# Patient Record
Sex: MALE | Race: WHITE | Employment: OTHER | URBAN - METROPOLITAN AREA
[De-identification: names, ages, dates, MRNs, and addresses within clinical notes are randomized per-mention and may not be internally consistent; named-entity substitution may affect disease eponyms.]

---

## 2017-05-18 ENCOUNTER — OFFICE VISIT (OUTPATIENT)
Dept: FAMILY MEDICINE CLINIC | Age: 62
End: 2017-05-18

## 2017-05-18 VITALS
BODY MASS INDEX: 26.55 KG/M2 | HEART RATE: 60 BPM | SYSTOLIC BLOOD PRESSURE: 128 MMHG | WEIGHT: 179.25 LBS | HEIGHT: 69 IN | DIASTOLIC BLOOD PRESSURE: 74 MMHG | RESPIRATION RATE: 14 BRPM

## 2017-05-18 DIAGNOSIS — N40.0 BENIGN NON-NODULAR PROSTATIC HYPERPLASIA WITHOUT LOWER URINARY TRACT SYMPTOMS: ICD-10-CM

## 2017-05-18 DIAGNOSIS — Z00.00 WELL ADULT EXAM: Primary | ICD-10-CM

## 2017-05-18 DIAGNOSIS — R53.83 FATIGUE, UNSPECIFIED TYPE: ICD-10-CM

## 2017-05-18 DIAGNOSIS — Z82.49 FAMILY HISTORY OF CORONARY ARTERIOSCLEROSIS: ICD-10-CM

## 2017-05-18 LAB
HEMOCCULT STL QL: NEGATIVE
HEMOCCULT STL QL: NORMAL
HEMOCCULT STL QL: NORMAL

## 2017-05-18 PROCEDURE — 82270 OCCULT BLOOD FECES: CPT | Performed by: FAMILY MEDICINE

## 2017-05-18 PROCEDURE — 99396 PREV VISIT EST AGE 40-64: CPT | Performed by: FAMILY MEDICINE

## 2017-05-18 RX ORDER — GUAIFENESIN/PHENYLPROPANOLAMIN
1 EXPECTORANT ORAL DAILY
COMMUNITY

## 2017-05-18 ASSESSMENT — ENCOUNTER SYMPTOMS
TROUBLE SWALLOWING: 0
ABDOMINAL PAIN: 0
CONSTIPATION: 0
EYE PAIN: 0
BLOOD IN STOOL: 0
NAUSEA: 0
COUGH: 0
BACK PAIN: 0
SORE THROAT: 0
CHEST TIGHTNESS: 0
SHORTNESS OF BREATH: 0

## 2017-05-18 ASSESSMENT — PATIENT HEALTH QUESTIONNAIRE - PHQ9
1. LITTLE INTEREST OR PLEASURE IN DOING THINGS: 0
SUM OF ALL RESPONSES TO PHQ QUESTIONS 1-9: 0
SUM OF ALL RESPONSES TO PHQ9 QUESTIONS 1 & 2: 0
2. FEELING DOWN, DEPRESSED OR HOPELESS: 0

## 2017-05-19 ENCOUNTER — TELEPHONE (OUTPATIENT)
Dept: FAMILY MEDICINE CLINIC | Age: 62
End: 2017-05-19

## 2017-05-19 LAB
ABSOLUTE BASO #: 0 K/UL (ref 0–0.1)
ABSOLUTE EOS #: 0.1 K/UL (ref 0.1–0.4)
ABSOLUTE LYMPH #: 2 K/UL (ref 0.8–5.2)
ABSOLUTE MONO #: 0.4 K/UL (ref 0.1–0.9)
ABSOLUTE NEUT #: 2.8 K/UL (ref 1.3–9.1)
ALBUMIN SERPL-MCNC: 4.5 G/DL (ref 3.2–5.3)
ALK PHOSPHATASE: 75 IU/L (ref 35–121)
ALT SERPL-CCNC: 21 IU/L (ref 5–59)
ANION GAP SERPL CALCULATED.3IONS-SCNC: 15 MMOL/L
AST SERPL-CCNC: 25 IU/L (ref 10–42)
BASOPHILS RELATIVE PERCENT: 0.7 %
BILIRUB SERPL-MCNC: 1.5 MG/DL (ref 0.2–1.3)
BUN BLDV-MCNC: 16 MG/DL (ref 10–20)
CALCIUM SERPL-MCNC: 9.8 MG/DL (ref 8.7–10.8)
CHLORIDE BLD-SCNC: 107 MMOL/L (ref 95–111)
CHOLESTEROL/HDL RATIO: 3.6
CHOLESTEROL: 182 MG/DL
CO2: 25 MMOL/L (ref 21–32)
CREAT SERPL-MCNC: 1.3 MG/DL (ref 0.5–1.3)
EGFR AFRICAN AMERICAN: 68
EGFR IF NONAFRICAN AMERICAN: 56
EOSINOPHILS RELATIVE PERCENT: 0.9 %
GLUCOSE: 93 MG/DL (ref 70–100)
HCT VFR BLD CALC: 45.5 % (ref 41.4–51)
HDLC SERPL-MCNC: 50 MG/DL (ref 40–60)
HEMOGLOBIN: 15.3 G/DL (ref 13.8–17)
LDL CHOLESTEROL CALCULATED: 117 MG/DL
LDL/HDL RATIO: 2.3
LYMPHOCYTE %: 37.7 %
MCH RBC QN AUTO: 29.5 PG (ref 27–34)
MCHC RBC AUTO-ENTMCNC: 33.6 G/DL (ref 31–36)
MCV RBC AUTO: 87.7 FL (ref 80–100)
MONOCYTES # BLD: 8.2 %
NEUTROPHILS RELATIVE PERCENT: 52.3 %
PDW BLD-RTO: 12.4 % (ref 10.8–14.8)
PLATELETS: 319 K/UL (ref 150–450)
POTASSIUM SERPL-SCNC: 4.6 MMOL/L (ref 3.5–5.4)
PSA, ULTRASENSITIVE: 2.26 NG/ML
RBC: 5.19 M/UL (ref 4–5.5)
SODIUM BLD-SCNC: 142 MMOL/L (ref 134–147)
TOTAL PROTEIN: 6.9 G/DL (ref 5.8–8)
TRIGL SERPL-MCNC: 74 MG/DL
TSH SERPL DL<=0.05 MIU/L-ACNC: 3.93 UIU/ML (ref 0.4–4.4)
VLDLC SERPL CALC-MCNC: 15 MG/DL
WBC: 5.4 K/UL (ref 3.7–10.8)

## 2018-01-05 ENCOUNTER — OFFICE VISIT (OUTPATIENT)
Dept: FAMILY MEDICINE CLINIC | Age: 63
End: 2018-01-05

## 2018-01-05 VITALS
RESPIRATION RATE: 14 BRPM | DIASTOLIC BLOOD PRESSURE: 74 MMHG | SYSTOLIC BLOOD PRESSURE: 120 MMHG | HEART RATE: 76 BPM | BODY MASS INDEX: 26.72 KG/M2 | HEIGHT: 69 IN | WEIGHT: 180.38 LBS

## 2018-01-05 DIAGNOSIS — S29.011A STRAIN OF CHEST WALL, INITIAL ENCOUNTER: Primary | ICD-10-CM

## 2018-01-05 PROCEDURE — 99213 OFFICE O/P EST LOW 20 MIN: CPT | Performed by: FAMILY MEDICINE

## 2018-01-05 RX ORDER — KETOROLAC TROMETHAMINE 10 MG/1
10 TABLET, FILM COATED ORAL EVERY 6 HOURS PRN
Qty: 20 TABLET | Refills: 0 | Status: SHIPPED | OUTPATIENT
Start: 2018-01-05 | End: 2019-01-31 | Stop reason: ALTCHOICE

## 2018-01-05 ASSESSMENT — ENCOUNTER SYMPTOMS
SHORTNESS OF BREATH: 0
SINUS PRESSURE: 0
CONSTIPATION: 0

## 2019-01-31 ENCOUNTER — OFFICE VISIT (OUTPATIENT)
Dept: FAMILY MEDICINE CLINIC | Age: 64
End: 2019-01-31

## 2019-01-31 VITALS
HEIGHT: 69 IN | RESPIRATION RATE: 12 BRPM | WEIGHT: 181.13 LBS | DIASTOLIC BLOOD PRESSURE: 70 MMHG | HEART RATE: 60 BPM | BODY MASS INDEX: 26.83 KG/M2 | SYSTOLIC BLOOD PRESSURE: 110 MMHG

## 2019-01-31 DIAGNOSIS — E78.5 HYPERLIPIDEMIA LDL GOAL <100: ICD-10-CM

## 2019-01-31 DIAGNOSIS — Z00.00 WELL ADULT EXAM: Primary | ICD-10-CM

## 2019-01-31 DIAGNOSIS — N40.0 BENIGN PROSTATIC HYPERPLASIA WITHOUT LOWER URINARY TRACT SYMPTOMS: ICD-10-CM

## 2019-01-31 DIAGNOSIS — R60.0 LEG EDEMA, LEFT: ICD-10-CM

## 2019-01-31 PROCEDURE — 99396 PREV VISIT EST AGE 40-64: CPT | Performed by: FAMILY MEDICINE

## 2019-01-31 ASSESSMENT — ENCOUNTER SYMPTOMS
SORE THROAT: 0
BACK PAIN: 0
EYE PAIN: 0
CHEST TIGHTNESS: 0
CONSTIPATION: 0
ABDOMINAL PAIN: 0
NAUSEA: 0
SHORTNESS OF BREATH: 0
BLOOD IN STOOL: 0
COUGH: 0
TROUBLE SWALLOWING: 0

## 2019-01-31 ASSESSMENT — PATIENT HEALTH QUESTIONNAIRE - PHQ9
SUM OF ALL RESPONSES TO PHQ QUESTIONS 1-9: 0
2. FEELING DOWN, DEPRESSED OR HOPELESS: 0
SUM OF ALL RESPONSES TO PHQ QUESTIONS 1-9: 0
1. LITTLE INTEREST OR PLEASURE IN DOING THINGS: 0
SUM OF ALL RESPONSES TO PHQ9 QUESTIONS 1 & 2: 0

## 2019-02-01 LAB
ABSOLUTE BASO #: 0.1 K/UL (ref 0–0.1)
ABSOLUTE EOS #: 0.1 K/UL (ref 0.1–0.4)
ABSOLUTE LYMPH #: 2.4 K/UL (ref 0.8–5.2)
ABSOLUTE MONO #: 0.6 K/UL (ref 0.1–0.9)
ABSOLUTE NEUT #: 4.1 K/UL (ref 1.3–9.1)
ALBUMIN SERPL-MCNC: 4.8 G/DL (ref 3.5–5.2)
ALK PHOSPHATASE: 78 U/L (ref 39–118)
ALT SERPL-CCNC: 26 U/L (ref 5–50)
ANION GAP SERPL CALCULATED.3IONS-SCNC: 10 MEQ/L (ref 10–19)
AST SERPL-CCNC: 23 U/L (ref 9–50)
BASOPHILS RELATIVE PERCENT: 1.2 %
BILIRUB SERPL-MCNC: 1.4 MG/DL
BUN BLDV-MCNC: 17 MG/DL (ref 8–23)
CALCIUM SERPL-MCNC: 10.2 MG/DL (ref 8.5–10.5)
CHLORIDE BLD-SCNC: 104 MEQ/L (ref 95–107)
CHOLESTEROL/HDL RATIO: 3.6
CHOLESTEROL: 186 MG/DL
CO2: 29 MEQ/L (ref 19–31)
CREAT SERPL-MCNC: 1.3 MG/DL (ref 0.8–1.4)
EGFR AFRICAN AMERICAN: 67.3 ML/MIN/1.73 M2
EGFR IF NONAFRICAN AMERICAN: 58.1 ML/MIN/1.73 M2
EOSINOPHILS RELATIVE PERCENT: 1.5 %
GLUCOSE: 95 MG/DL (ref 70–99)
HCT VFR BLD CALC: 45.2 % (ref 41.4–51)
HDLC SERPL-MCNC: 51.2 MG/DL
HEMOGLOBIN: 15.6 G/DL (ref 13.8–17)
LDL CHOLESTEROL CALCULATED: 120 MG/DL
LDL/HDL RATIO: 2.3
LYMPHOCYTE %: 33 %
MCH RBC QN AUTO: 29.8 PG (ref 27–34)
MCHC RBC AUTO-ENTMCNC: 34.5 G/DL (ref 31–36)
MCV RBC AUTO: 86.3 FL (ref 80–100)
MONOCYTES # BLD: 8 %
NEUTROPHILS RELATIVE PERCENT: 56.2 %
PDW BLD-RTO: 12.2 % (ref 10.8–14.8)
PLATELETS: 347 K/UL (ref 150–450)
POTASSIUM SERPL-SCNC: 5.2 MEQ/L (ref 3.5–5.4)
PSA, ULTRASENSITIVE: 4.51 NG/ML
RBC: 5.24 M/UL (ref 4–5.5)
SODIUM BLD-SCNC: 143 MEQ/L (ref 135–146)
TOTAL PROTEIN: 7.5 G/DL (ref 6.1–8.3)
TRIGL SERPL-MCNC: 75 MG/DL
TSH SERPL DL<=0.05 MIU/L-ACNC: 3.73 UIU/ML (ref 0.4–4.1)
VLDLC SERPL CALC-MCNC: 15 MG/DL
WBC: 7.4 K/UL (ref 3.7–10.8)

## 2019-02-04 ENCOUNTER — TELEPHONE (OUTPATIENT)
Dept: FAMILY MEDICINE CLINIC | Age: 64
End: 2019-02-04

## 2019-02-04 DIAGNOSIS — R97.20 ELEVATED PSA, LESS THAN 10 NG/ML: Primary | ICD-10-CM

## 2019-02-04 RX ORDER — CIPROFLOXACIN 500 MG/1
500 TABLET, FILM COATED ORAL 2 TIMES DAILY
Qty: 30 TABLET | Refills: 0 | Status: SHIPPED | OUTPATIENT
Start: 2019-02-04 | End: 2019-02-19

## 2019-05-02 ENCOUNTER — TELEPHONE (OUTPATIENT)
Dept: FAMILY MEDICINE CLINIC | Age: 64
End: 2019-05-02

## 2019-05-02 DIAGNOSIS — R97.20 ELEVATED PSA, LESS THAN 10 NG/ML: Primary | ICD-10-CM

## 2019-05-02 LAB — PSA, ULTRASENSITIVE: 3.62 NG/ML (ref 0–4)

## 2019-05-02 NOTE — TELEPHONE ENCOUNTER
psa better as dropped  And get one in august and appt in august as need to follow and recheck the trend

## 2019-08-01 LAB — PSA, ULTRASENSITIVE: 3.23 NG/ML (ref 0–4)

## 2019-08-02 ENCOUNTER — TELEPHONE (OUTPATIENT)
Dept: FAMILY MEDICINE CLINIC | Age: 64
End: 2019-08-02

## 2020-02-20 ENCOUNTER — OFFICE VISIT (OUTPATIENT)
Dept: FAMILY MEDICINE CLINIC | Age: 65
End: 2020-02-20

## 2020-02-20 VITALS
BODY MASS INDEX: 26.72 KG/M2 | HEIGHT: 69 IN | WEIGHT: 180.38 LBS | DIASTOLIC BLOOD PRESSURE: 66 MMHG | HEART RATE: 68 BPM | SYSTOLIC BLOOD PRESSURE: 118 MMHG | RESPIRATION RATE: 16 BRPM

## 2020-02-20 LAB
HEMOCCULT STL QL: NEGATIVE
HEMOCCULT STL QL: NORMAL
HEMOCCULT STL QL: NORMAL

## 2020-02-20 PROCEDURE — 82270 OCCULT BLOOD FECES: CPT | Performed by: FAMILY MEDICINE

## 2020-02-20 PROCEDURE — 99396 PREV VISIT EST AGE 40-64: CPT | Performed by: FAMILY MEDICINE

## 2020-02-20 ASSESSMENT — PATIENT HEALTH QUESTIONNAIRE - PHQ9
SUM OF ALL RESPONSES TO PHQ9 QUESTIONS 1 & 2: 0
SUM OF ALL RESPONSES TO PHQ QUESTIONS 1-9: 0
2. FEELING DOWN, DEPRESSED OR HOPELESS: 0
1. LITTLE INTEREST OR PLEASURE IN DOING THINGS: 0
SUM OF ALL RESPONSES TO PHQ QUESTIONS 1-9: 0

## 2020-02-20 ASSESSMENT — ENCOUNTER SYMPTOMS
BACK PAIN: 0
TROUBLE SWALLOWING: 0
NAUSEA: 0
COUGH: 0
SORE THROAT: 0
ABDOMINAL PAIN: 0
CHEST TIGHTNESS: 0
BLOOD IN STOOL: 0
SHORTNESS OF BREATH: 0
EYE PAIN: 0
CONSTIPATION: 0

## 2020-02-20 NOTE — PROGRESS NOTES
Narrative    Not on file       Wt Readings from Last 3 Encounters:   02/20/20 180 lb 6 oz (81.8 kg)   01/31/19 181 lb 2 oz (82.2 kg)   01/05/18 180 lb 6 oz (81.8 kg)     BP Readings from Last 3 Encounters:   02/20/20 118/66   01/31/19 110/70   01/05/18 120/74        Vitals:    02/20/20 0952   BP: 118/66   Site: Right Upper Arm   Position: Sitting   Cuff Size: Medium Adult   Pulse: 68   Resp: 16   Weight: 180 lb 6 oz (81.8 kg)   Height: 5' 9\" (1.753 m)     Body mass index is 26.64 kg/m². Physical Exam  Vitals signs and nursing note reviewed. Constitutional:       Appearance: He is well-developed. HENT:      Head: Normocephalic and atraumatic. Right Ear: Tympanic membrane and external ear normal.      Left Ear: Tympanic membrane and external ear normal.      Nose: Nose normal.   Eyes:      Conjunctiva/sclera: Conjunctivae normal.      Pupils: Pupils are equal, round, and reactive to light. Comments: Fundi nl   Neck:      Musculoskeletal: Normal range of motion and neck supple. Thyroid: No thyromegaly. Cardiovascular:      Rate and Rhythm: Normal rate and regular rhythm. Heart sounds: Normal heart sounds. Pulmonary:      Effort: Pulmonary effort is normal.      Breath sounds: Normal breath sounds. No wheezing or rales. Abdominal:      General: Bowel sounds are normal.      Palpations: Abdomen is soft. There is no mass. Tenderness: There is no abdominal tenderness. Hernia: There is no hernia in the right inguinal area or left inguinal area. Genitourinary:     Penis: Normal and circumcised. Scrotum/Testes: Normal.         Right: Mass, tenderness or swelling not present. Left: Mass, tenderness or swelling not present. Epididymis:      Right: Normal.      Left: Normal.      Prostate: Enlarged ( 2 +). Not tender and no nodules present. Rectum: Normal. Guaiac result negative. No mass, tenderness or external hemorrhoid. Normal anal tone.    Musculoskeletal: Standing Status:   Future     Standing Expiration Date:   2/19/2021     Order Specific Question:   Is Patient Fasting?/# of Hours     Answer:   YES 12 HOURS    TSH with Reflex     Standing Status:   Future     Standing Expiration Date:   2/19/2021    Comprehensive Metabolic Panel     Standing Status:   Future     Standing Expiration Date:   2/19/2021    CBC Auto Differential     Standing Status:   Future     Standing Expiration Date:   2/20/2021    PSA, Prostatic Specific Antigen     Standing Status:   Future     Standing Expiration Date:   2/20/2021    POCT occult blood stool     Results for orders placed or performed in visit on 02/20/20   POCT occult blood stool   Result Value Ref Range    OCCULT BLOOD FECAL negative     OCCULT BLOOD FECAL      OCCULT BLOOD FECAL        No results found for this visit on 02/20/20.

## 2020-02-21 LAB
ABSOLUTE BASO #: 0 X10E9/L (ref 0–0.9)
ABSOLUTE EOS #: 0.2 X10E9/L (ref 0–0.4)
ABSOLUTE LYMPH #: 2 X10E9/L (ref 1–3.5)
ABSOLUTE MONO #: 0.5 X10E9/L (ref 0–0.9)
ABSOLUTE NEUT #: 3.1 X10E9/L (ref 1.5–6.6)
ALBUMIN SERPL-MCNC: 4.6 G/DL (ref 3.2–5.3)
ALK PHOSPHATASE: 69 U/L (ref 39–130)
ALT SERPL-CCNC: 19 U/L (ref 0–40)
ANION GAP SERPL CALCULATED.3IONS-SCNC: 11 MMOL/L (ref 5–15)
AST SERPL-CCNC: 25 U/L (ref 0–41)
BASOPHILS RELATIVE PERCENT: 0.8 %
BILIRUB SERPL-MCNC: 1.6 MG/DL (ref 0.3–1.2)
BUN BLDV-MCNC: 15 MG/DL (ref 5–27)
CALCIUM SERPL-MCNC: 9.6 MG/DL (ref 8.5–10.5)
CHLORIDE BLD-SCNC: 105 MMOL/L (ref 98–109)
CHOLESTEROL/HDL RATIO: 3.7 (ref 1–5)
CHOLESTEROL: 163 MG/DL (ref 150–200)
CO2: 27 MMOL/L (ref 22–32)
CREAT SERPL-MCNC: 1.13 MG/DL (ref 0.6–1.3)
EGFR AFRICAN AMERICAN: >60 ML/MIN/1.73SQ.M
EGFR IF NONAFRICAN AMERICAN: >60 ML/MIN/1.73SQ.M
EOSINOPHILS RELATIVE PERCENT: 2.8 %
GLUCOSE: 93 MG/DL (ref 65–99)
HCT VFR BLD CALC: 45.6 % (ref 39–49)
HDLC SERPL-MCNC: 44 MG/DL
HEMOGLOBIN: 15.9 G/DL (ref 13.1–17.3)
LDL CHOLESTEROL CALCULATED: 105 MG/DL
LYMPHOCYTE %: 34.4 %
MCH RBC QN AUTO: 31.1 PG (ref 27–35)
MCHC RBC AUTO-ENTMCNC: 34.9 G/DL (ref 32–36)
MCV RBC AUTO: 89 FL (ref 81–101)
MONOCYTES # BLD: 8.1 %
NEUTROPHILS RELATIVE PERCENT: 53.9 %
PDW BLD-RTO: 12.8 % (ref 11.4–14.3)
PLATELETS: 307 X10E9/L (ref 150–450)
PMV BLD AUTO: 7.1 FL (ref 7–12)
POTASSIUM SERPL-SCNC: 4.9 MMOL/L (ref 3.5–5)
PSA, ULTRASENSITIVE: 5.05 NG/ML (ref 0–4)
RBC: 5.12 X10E12/L (ref 4.25–5.65)
SODIUM BLD-SCNC: 143 MMOL/L (ref 134–146)
TOTAL PROTEIN: 7.2 G/DL (ref 6–8)
TRIGL SERPL-MCNC: 70 MG/DL (ref 27–150)
TSH SERPL DL<=0.05 MIU/L-ACNC: 4.39 UIU/ML (ref 0.49–4.67)
VLDLC SERPL CALC-MCNC: 14 MG/DL (ref 0–30)
WBC: 5.8 X10E9/L (ref 4.4–12)

## 2020-02-24 ENCOUNTER — TELEPHONE (OUTPATIENT)
Dept: FAMILY MEDICINE CLINIC | Age: 65
End: 2020-02-24

## 2020-03-05 ENCOUNTER — OFFICE VISIT (OUTPATIENT)
Dept: UROLOGY | Age: 65
End: 2020-03-05
Payer: COMMERCIAL

## 2020-03-05 VITALS
SYSTOLIC BLOOD PRESSURE: 122 MMHG | WEIGHT: 181.8 LBS | BODY MASS INDEX: 26.93 KG/M2 | DIASTOLIC BLOOD PRESSURE: 70 MMHG | HEIGHT: 69 IN

## 2020-03-05 PROBLEM — R97.20 ELEVATED PSA: Status: ACTIVE | Noted: 2020-03-05

## 2020-03-05 PROBLEM — N40.0 HYPERTROPHY OF PROSTATE WITHOUT URINARY OBSTRUCTION: Status: ACTIVE | Noted: 2020-03-05

## 2020-03-05 LAB
BILIRUBIN URINE: NEGATIVE
BLOOD URINE, POC: NORMAL
CHARACTER, URINE: CLEAR
COLOR, URINE: YELLOW
GLUCOSE URINE: NEGATIVE MG/DL
KETONES, URINE: NEGATIVE
LEUKOCYTE CLUMPS, URINE: NEGATIVE
NITRITE, URINE: NEGATIVE
PH, URINE: 7 (ref 5–9)
PROTEIN, URINE: NEGATIVE MG/DL
SPECIFIC GRAVITY, URINE: >= 1.03 (ref 1–1.03)
UROBILINOGEN, URINE: 0.2 EU/DL (ref 0–1)

## 2020-03-05 PROCEDURE — G8484 FLU IMMUNIZE NO ADMIN: HCPCS | Performed by: NURSE PRACTITIONER

## 2020-03-05 PROCEDURE — 3017F COLORECTAL CA SCREEN DOC REV: CPT | Performed by: NURSE PRACTITIONER

## 2020-03-05 PROCEDURE — 99203 OFFICE O/P NEW LOW 30 MIN: CPT | Performed by: NURSE PRACTITIONER

## 2020-03-05 PROCEDURE — 1036F TOBACCO NON-USER: CPT | Performed by: NURSE PRACTITIONER

## 2020-03-05 PROCEDURE — G8419 CALC BMI OUT NRM PARAM NOF/U: HCPCS | Performed by: NURSE PRACTITIONER

## 2020-03-05 PROCEDURE — G8427 DOCREV CUR MEDS BY ELIG CLIN: HCPCS | Performed by: NURSE PRACTITIONER

## 2020-03-05 NOTE — PROGRESS NOTES
atraumatic. Mouth/Throat:          Mucous membranes are normal.   Eyes:         EOM are normal. No scleral icterus. Nose:    The external appearance of the nose is normal  Ears: The ears appear normal to external inspection   Neck:         Supple, symmetrical, trachea midline, no adenopathy, thyroid symmetric, not enlarged and no tenderness. Cardiovascular:        Normal rate, regular rhythm, S1 S2 heart sounds. Pulmonary/Chest:       Chest symmetric with normal A/P diameter, no wheezes, rales, or rhonchi noted. Normal respiratory rate and rhthym. No use of accessory muscles. Abdominal:          Soft. No tenderness. Active bowel sounds. Genitalia: Normal rectal anal sphincter tone. The prostate is 4x4 cm, soft and benign in consistency. The median furrow is palpable. The lateral sulci are easily appreciated with some enlargement of the right lateral lobe. No nodules. The prostate is freely moveable in the pelvis. There are no other rectal masses. Musculoskeletal:    Normal range of motion. He exhibits no edema or tenderness of lower extremities. Extremities:    No cyanosis, clubbing, or edema present. Neurological:    Alert and oriented. No cranial nerve deficit. There are no focalizing motor or sensory deficits.     DATA:  CBC:   Lab Results   Component Value Date    WBC 5.8 02/20/2020    RBC 5.12 02/20/2020    HGB 15.9 02/20/2020    HCT 45.6 02/20/2020    MCV 89 02/20/2020    MCH 31.1 02/20/2020    MCHC 34.9 02/20/2020    RDW 12.8 02/20/2020     02/20/2020    MPV 7.1 02/20/2020     BMP:    Lab Results   Component Value Date     02/20/2020    K 4.9 02/20/2020     02/20/2020    CO2 27 02/20/2020    BUN 15 02/20/2020    CREATININE 1.13 02/20/2020    CALCIUM 9.6 02/20/2020    GLUCOSE 93 02/20/2020     BUN/Creatinine:    Lab Results   Component Value Date    BUN 15 02/20/2020    CREATININE 1.13 02/20/2020     Imaging:   No imaging for review    Assessment & Plan:

## 2020-04-22 ENCOUNTER — TELEPHONE (OUTPATIENT)
Dept: UROLOGY | Age: 65
End: 2020-04-22

## 2020-04-27 ENCOUNTER — TELEPHONE (OUTPATIENT)
Dept: UROLOGY | Age: 65
End: 2020-04-27

## 2020-04-27 NOTE — TELEPHONE ENCOUNTER
Reached out to [facility], still have not yet to receive the results for patient. Called and verified fax number with company and they have correct fax number on file. Will send out again. If we do not received results will have to try an alternative fax number. I Reached out to patient, informed him that I called out again to [facility] and they are going to try to refax results. Apology was given the its taking this long to get these results. Explained that I did reach out to them last week like I said  to fax and that we are still have not yet to received them. Patient voiced understanding. Will have to try another fax number if we do not received them today.

## 2020-04-29 ENCOUNTER — NURSE ONLY (OUTPATIENT)
Dept: LAB | Age: 65
End: 2020-04-29

## 2020-04-29 LAB — PROSTATE SPECIFIC ANTIGEN: 4.17 NG/ML (ref 0–1)

## 2020-05-05 ENCOUNTER — PATIENT MESSAGE (OUTPATIENT)
Dept: UROLOGY | Age: 65
End: 2020-05-05

## 2020-05-05 NOTE — TELEPHONE ENCOUNTER
Spoke with patient he is okay with the VV. Stated that he received a message in his My chart, that he had an office visit tomorrow. He sent message to lb what his visit was meseret being VV or office. He is okay with having a VV visit that it was he was planning to do.

## 2020-05-06 ENCOUNTER — VIRTUAL VISIT (OUTPATIENT)
Dept: UROLOGY | Age: 65
End: 2020-05-06
Payer: OTHER GOVERNMENT

## 2020-05-06 PROCEDURE — 99214 OFFICE O/P EST MOD 30 MIN: CPT | Performed by: NURSE PRACTITIONER

## 2020-05-06 RX ORDER — CIPROFLOXACIN 500 MG/1
500 TABLET, FILM COATED ORAL 2 TIMES DAILY
Qty: 6 TABLET | Refills: 0 | Status: SHIPPED | OUTPATIENT
Start: 2020-05-06 | End: 2020-05-09

## 2020-05-06 RX ORDER — GENTAMICIN SULFATE 40 MG/ML
80 INJECTION, SOLUTION INTRAMUSCULAR; INTRAVENOUS ONCE
Qty: 2 ML | Refills: 0 | Status: SHIPPED | OUTPATIENT
Start: 2020-05-06 | End: 2020-05-06

## 2020-05-06 ASSESSMENT — ENCOUNTER SYMPTOMS
COLOR CHANGE: 0
SHORTNESS OF BREATH: 0
CHEST TIGHTNESS: 0
DIARRHEA: 0
CONSTIPATION: 0
EYE REDNESS: 0
BACK PAIN: 0

## 2020-05-06 NOTE — PROGRESS NOTES
Patient:  Chester Andrade    Review of Systems    Review of Systems   Constitutional: Negative for fever and unexpected weight change. HENT: Negative for ear discharge and ear pain. Eyes: Negative for redness and visual disturbance. Respiratory: Negative for chest tightness and shortness of breath. Cardiovascular: Negative for chest pain and leg swelling. Gastrointestinal: Negative for constipation and diarrhea. Endocrine: Negative for cold intolerance and heat intolerance. Genitourinary: Negative for hematuria and urgency. Musculoskeletal: Negative for back pain and neck pain. Skin: Negative for color change and rash. Allergic/Immunologic: Negative for environmental allergies and food allergies. Neurological: Negative for light-headedness and numbness. Hematological: Does not bruise/bleed easily.

## 2020-05-12 ENCOUNTER — TELEPHONE (OUTPATIENT)
Dept: UROLOGY | Age: 65
End: 2020-05-12

## 2020-05-12 NOTE — TELEPHONE ENCOUNTER
PROSTATE ULTRASOUND/BIOPSY/GOLD SEED PLACEMENT    Ranjan Larsen [facility]      1955    You are scheduled for the above procedure on:  6/9/20  at:  8:30 am    **PLEASE ARRIVE AT 8:15 AM**  Your follow up appointment for your biopsy results is on : 6/16/20    At:   8:30 AM **THIS WILL BE A VIRTUAL VISIT -DR Mora Sacks WILL CALL YOUR CELL PHONE TO DISCUSS THE RESULTS    Please note that you will be responsible for any charges that are not paid by your insurance. The prostate biopsy specimens are sent to a Lab and their charges are billed to you separate from the office charges. [address] [place] [address]    DESCRIPTION OF PROSTATIC ULTRASOUND AND BIOPSY  Ultrasound uses harmless sound waves to give us pictures of the prostate, and allows us to accurately guide a biopsy needle to areas of concern. The procedure is done in the office. Initially, a complete prostate exam is done. Next the ultrasound probe, finger-like in size and shape, is placed into the rectum. With slight movement of the probe, many different views are obtained. A prostate block may or may not be given at this time. A spring loaded fine needle is place through the probe and directed into the prostate. Six or more biopsies are usually taken. These biopsies are not usually painful. The entire exam takes 20-30 minutes. POSSIBLE RISKS OF THE PROCEDURE  Blood may be noted in the stool and urine for a few days. Blood may be seen in the semen for up to a month. Infection of the prostate or in the urine can occur even with antibiotic preparation. You should call if you develop fever, chills, severe pain, or have continuous or significant bleeding. If you have known hemorrhoids, you may have more bleeding and discomfort in the rectal area following the biopsy. This may last for 3-5 days afterwards. If any concerns arise, please call the office.     PREPARATION  1.  DO NOT TAKE: ASPIRIN, MOTRIN, PERSANTINE, PLAVIX, IBUPROFEN, ARTHRITIS TYPE MEDICATION, COUMADIN, FISH OIL, VIT E, AND BLOOD THINNERS FOR 7 DAYS BEFORE THE BIOPSY AND 3 DAYS AFTER THE BIOPSY. SEE ATTACHED LIST OF ADDITIONAL MEDICATIONS. YOU MAY TAKE TYLENOL IF NEEDED  2. Please eat a regular breakfast/lunch. 3.  Take your antibiotics as directed:  Cipro 500 mg twice a day, start on:   6/8/20    take until finished. 4.  Bring your Gentamicin 80 mg with you to your appointment. 5.  Take a Fleets Enema 2 hours before the visit. Purchase it at any drug store and follow the instructions on the package. 6. Please ensure to bring a  with you the day of the surgery to transport you home. HOME GOING AND FOLLOW UP INSTRUCTIONS  Call the doctor if: 1. There is a large amount of blood or clots in the urine or stool. 2.  You are unable to urinate. 3.  If your temperature is 101 degrees F or greater.            4.  You may resume your regular medication except those on  attached list.    DATE: 5/12/2020       SIGNATURE:________________________________

## 2020-05-26 ENCOUNTER — NURSE ONLY (OUTPATIENT)
Dept: UROLOGY | Age: 65
End: 2020-05-26

## 2020-05-26 VITALS — TEMPERATURE: 99.6 F

## 2020-05-31 LAB — MISC. #1 REFERENCE GROUP TEST: NORMAL

## 2020-06-09 ENCOUNTER — TELEPHONE (OUTPATIENT)
Dept: UROLOGY | Age: 65
End: 2020-06-09

## 2020-06-09 ENCOUNTER — PROCEDURE VISIT (OUTPATIENT)
Dept: UROLOGY | Age: 65
End: 2020-06-09
Payer: OTHER GOVERNMENT

## 2020-06-09 VITALS — HEIGHT: 69 IN | WEIGHT: 180 LBS | BODY MASS INDEX: 26.66 KG/M2 | TEMPERATURE: 98.6 F

## 2020-06-09 PROCEDURE — 99212 OFFICE O/P EST SF 10 MIN: CPT | Performed by: UROLOGY

## 2020-06-09 PROCEDURE — 96372 THER/PROPH/DIAG INJ SC/IM: CPT | Performed by: UROLOGY

## 2020-06-09 RX ORDER — GENTAMICIN SULFATE 40 MG/ML
80 INJECTION, SOLUTION INTRAMUSCULAR; INTRAVENOUS ONCE
Status: COMPLETED | OUTPATIENT
Start: 2020-06-09 | End: 2020-06-09

## 2020-06-09 RX ADMIN — GENTAMICIN SULFATE 80 MG: 40 INJECTION, SOLUTION INTRAMUSCULAR; INTRAVENOUS at 08:25

## 2020-06-09 NOTE — TELEPHONE ENCOUNTER
SURGERY [address]  [address] [place] [address] [address] Jacky Littlejohn, [address]      Phone *[phone] *[phone]   Surgical Scheduling Direct Line Phone *[phone] Fax *[phone]      Tessa Quijano 1955 male    Ramone Huffman [address]  Jayjay Virk   Marital Status:          Home Phone: [phone]      Cell Phone:    Telephone Information:   Mobile [phone]          Surgeon: Dr. Karine Mathews  Surgery Date: 6/22/20   Time: 8:30 am    Procedure: Transrectal ultrasound with prostate biopsy                    Estefanía notified in Ultrasound    Diagnosis: Elevated Psa     Important Medical History: In Epic    Special Inst/Equip: Regular Room    CPT Codes:    43286     Latex Allergy:  No     Cardiac Device:  No     Anesthesia:  MAC          Admission Type:  Same Day                             Admit Prior to Day of Surgery: No    Case Location:  Main OR           Preadmission Testing:Phone Call              PAT Date and Time:______________________________________________________    PAT Confirmation #: ______________________________________________________    Post Op Visit: ___________________________________________________________    Need Preop Cardiac Clearance: No    Does Patient have Cardiologist/physician?      None    Surgery Confirmation #: __________________________________________________    : ________________________   Date: __________________________     [employer] Name: Gayle Aguirre

## 2020-06-09 NOTE — PROGRESS NOTES
Procedure: Trus/Biopsy  Pt name and birth date verified Yes  Patient agrees Dr. Sharad Garcia is taking biopsies of the prostate Yes  Patient took Enema 2 hours prior to procedure Yes  Patient took 2 pill(s) of 1 day before procedure, day of, and will the day after Yes  Has patient eaten today? Yes   Patient stopped all blood thinners prior to surgery No does not apply       Patient has given me verbal consent to perform Gentamicin Injection   Yes     Following Dr. Loni Schlatter of care.   Gentamicin 80MG/2ML GIVEN [employer] Right [employer] HIP  Lot Number: [id number]  Expiration Date: 09-20  [address] #: [id number]    Patient supplied their own medications No

## 2020-06-09 NOTE — PROGRESS NOTES
Assessment:      Prostate biopsy could not be performed due to inability for the patient to relax the anal sphincter. Plan:        I will see [facility] when he is rescheduled his trus and biopsies under MAC in the operating room.

## 2020-06-10 ENCOUNTER — HOSPITAL ENCOUNTER (OUTPATIENT)
Age: 65
Discharge: HOME | End: 2020-06-10
Payer: OTHER GOVERNMENT

## 2020-06-10 ENCOUNTER — HOSPITAL ENCOUNTER (OUTPATIENT)
Dept: GENERAL RADIOLOGY | Age: 65
Discharge: HOME | End: 2020-06-10
Payer: OTHER GOVERNMENT

## 2020-06-10 LAB
AMORPHOUS: ABNORMAL
ANION GAP SERPL CALCULATED.3IONS-SCNC: 9 MEQ/L (ref 8–16)
BACTERIA: ABNORMAL /HPF
BASOPHILS # BLD: 0.9 %
BASOPHILS ABSOLUTE: 0.1 THOU/MM3 (ref 0–0.1)
BILIRUBIN URINE: NEGATIVE
BLOOD, URINE: NEGATIVE
BUN BLDV-MCNC: 12 MG/DL (ref 7–22)
CALCIUM SERPL-MCNC: 9.2 MG/DL (ref 8.5–10.5)
CASTS 2: ABNORMAL /LPF
CASTS UA: ABNORMAL /LPF
CHARACTER, URINE: CLEAR
CHLORIDE BLD-SCNC: 103 MEQ/L (ref 98–111)
CO2: 27 MEQ/L (ref 23–33)
COLOR: YELLOW
CREAT SERPL-MCNC: 1.1 MG/DL (ref 0.4–1.2)
CRYSTALS, UA: ABNORMAL
EKG ATRIAL RATE: 61 BPM
EKG P AXIS: 59 DEGREES
EKG P-R INTERVAL: 180 MS
EKG Q-T INTERVAL: 436 MS
EKG QRS DURATION: 84 MS
EKG QTC CALCULATION (BAZETT): 438 MS
EKG R AXIS: 74 DEGREES
EKG VENTRICULAR RATE: 61 BPM
EOSINOPHIL # BLD: 1.9 %
EOSINOPHILS ABSOLUTE: 0.1 THOU/MM3 (ref 0–0.4)
EPITHELIAL CELLS, UA: ABNORMAL /HPF
ERYTHROCYTE [DISTWIDTH] IN BLOOD BY AUTOMATED COUNT: 12.2 % (ref 11.5–14.5)
ERYTHROCYTE [DISTWIDTH] IN BLOOD BY AUTOMATED COUNT: 40.1 FL (ref 35–45)
GFR SERPL CREATININE-BSD FRML MDRD: 67 ML/MIN/1.73M2
GLUCOSE BLD-MCNC: 96 MG/DL (ref 70–108)
GLUCOSE URINE: NEGATIVE MG/DL
HCT VFR BLD CALC: 45.5 % (ref 42–52)
HEMOGLOBIN: 15.1 GM/DL (ref 14–18)
IMMATURE GRANS (ABS): 0.01 THOU/MM3 (ref 0–0.07)
IMMATURE GRANULOCYTES: 0.2 %
KETONES, URINE: NEGATIVE
LEUKOCYTE ESTERASE, URINE: ABNORMAL
LYMPHOCYTES # BLD: 41 %
LYMPHOCYTES ABSOLUTE: 2.7 THOU/MM3 (ref 1–4.8)
MCH RBC QN AUTO: 30 PG (ref 26–33)
MCHC RBC AUTO-ENTMCNC: 33.2 GM/DL (ref 32.2–35.5)
MCV RBC AUTO: 90.3 FL (ref 80–94)
MISCELLANEOUS 2: ABNORMAL
MONOCYTES # BLD: 7.7 %
MONOCYTES ABSOLUTE: 0.5 THOU/MM3 (ref 0.4–1.3)
NITRITE, URINE: NEGATIVE
NUCLEATED RED BLOOD CELLS: 0 /100 WBC
PH UA: 5 (ref 5–9)
PLATELET # BLD: 277 THOU/MM3 (ref 130–400)
PMV BLD AUTO: 8.8 FL (ref 9.4–12.4)
POTASSIUM SERPL-SCNC: 4.3 MEQ/L (ref 3.5–5.2)
PROTEIN UA: NEGATIVE
RBC # BLD: 5.04 MILL/MM3 (ref 4.7–6.1)
RBC URINE: ABNORMAL /HPF
RENAL EPITHELIAL, UA: ABNORMAL
SEG NEUTROPHILS: 48.3 %
SEGMENTED NEUTROPHILS ABSOLUTE COUNT: 3.1 THOU/MM3 (ref 1.8–7.7)
SODIUM BLD-SCNC: 139 MEQ/L (ref 135–145)
SPECIFIC GRAVITY, URINE: 1.02 (ref 1–1.03)
UROBILINOGEN, URINE: 0.2 EU/DL (ref 0–1)
WBC # BLD: 6.5 THOU/MM3 (ref 4.8–10.8)
WBC UA: ABNORMAL /HPF
YEAST: ABNORMAL

## 2020-06-10 PROCEDURE — 93005 ELECTROCARDIOGRAM TRACING: CPT

## 2020-06-10 PROCEDURE — 36415 COLL VENOUS BLD VENIPUNCTURE: CPT

## 2020-06-10 PROCEDURE — 80048 BASIC METABOLIC PNL TOTAL CA: CPT

## 2020-06-10 PROCEDURE — 71046 X-RAY EXAM CHEST 2 VIEWS: CPT

## 2020-06-10 PROCEDURE — 81001 URINALYSIS AUTO W/SCOPE: CPT

## 2020-06-10 PROCEDURE — 85025 COMPLETE CBC W/AUTO DIFF WBC: CPT

## 2020-06-11 PROCEDURE — 93010 ELECTROCARDIOGRAM REPORT: CPT | Performed by: INTERNAL MEDICINE

## 2020-06-15 ENCOUNTER — TELEPHONE (OUTPATIENT)
Dept: UROLOGY | Age: 65
End: 2020-06-15

## 2020-06-15 NOTE — PROGRESS NOTES
Covid screening questionnaire complete and negative for symptoms or exposure see chart for documentation    Inst to bring one person along and both of them to wear masks. Temps will be taken at door. Reminded to get COVID testing done.

## 2020-06-15 NOTE — TELEPHONE ENCOUNTER
Called and spoke with Crystal with [employer] ([address] [facility]) to see if CPT 64097.  Call ref# [id number]

## 2020-06-17 ENCOUNTER — HOSPITAL ENCOUNTER (OUTPATIENT)
Age: 65
Discharge: HOME | End: 2020-06-17
Payer: OTHER GOVERNMENT

## 2020-06-17 LAB
PERFORMING LAB: NORMAL
REPORT: NORMAL
SARS-COV-2: NOT DETECTED

## 2020-06-17 PROCEDURE — U0002 COVID-19 LAB TEST NON-CDC: HCPCS

## 2020-06-19 ENCOUNTER — PREP FOR PROCEDURE (OUTPATIENT)
Dept: UROLOGY | Age: 65
End: 2020-06-19

## 2020-06-19 RX ORDER — SODIUM CHLORIDE 9 MG/ML
INJECTION, SOLUTION INTRAVENOUS CONTINUOUS
Status: CANCELLED | OUTPATIENT
Start: 2020-06-22

## 2020-06-21 NOTE — H&P
Chase Woody MD  History and Physical    Patient:  Karli Elizabeth  MRN: [id number]  YOB: 1955    HISTORY OF PRESENT ILLNESS:     The patient is a 60 y.o. male who presents with elevated psa. Here for procedure. Patient's old records, notes and chart reviewed and summarized above. Chase Woody MD independently reviewed the images and verified the radiology reports from:    No results found. Past Medical History:    Past Medical History:   Diagnosis Date    BPH (benign prostatic hyperplasia)     GERD (gastroesophageal reflux disease)     Headache(784.0)     Schatzki's ring     Varicose veins        Past Surgical History:    Past Surgical History:   Procedure Laterality Date    COLONOSCOPY  2007 and  2015 2018    fam  hx  of  sibling    nanci     KNEE ARTHROSCOPY Left 1998       Medications Prior to Admission:    Prior to Admission medications    Medication Sig Start Date End Date Taking? Authorizing Provider   Coenzyme Q10 (CO Q 10) 100 MG CAPS Take 1 capsule by mouth daily   Yes Historical Provider, MD   Saw [place] 500 MG CAPS Take 1 tablet by mouth daily   Yes Historical Provider, MD   Misc Natural Products (LUTEIN 20) CAPS Take 1 tablet by mouth daily   Yes Historical Provider, MD   multivitamin [facility]) per tablet Take 1 tablet by mouth daily. Yes Historical Provider, MD   vitamin B-12 (CYANOCOBALAMIN) 1000 MCG tablet Take 1,000 mcg by mouth daily. Yes Historical Provider, MD   FISH OIL Take 1 capsule by mouth daily    Yes Historical Provider, MD       Allergies:  Patient has no known allergies.     Social History:    Social History     Socioeconomic History    Marital status:      Spouse name: Not on file    Number of children: Not on file    Years of education: Not on file    Highest education level: Not on file   Occupational History    Not on file   Social Needs    Financial resource strain: Not on file    Food insecurity     Worry: Not on

## 2020-06-22 ENCOUNTER — ANESTHESIA (OUTPATIENT)
Dept: OPERATING ROOM | Age: 65
End: 2020-06-22
Payer: OTHER GOVERNMENT

## 2020-06-22 ENCOUNTER — ANESTHESIA EVENT (OUTPATIENT)
Dept: OPERATING ROOM | Age: 65
End: 2020-06-22
Payer: OTHER GOVERNMENT

## 2020-06-22 ENCOUNTER — APPOINTMENT (OUTPATIENT)
Dept: ULTRASOUND IMAGING | Age: 65
End: 2020-06-22
Attending: UROLOGY
Payer: OTHER GOVERNMENT

## 2020-06-22 ENCOUNTER — HOSPITAL ENCOUNTER (OUTPATIENT)
Age: 65
Setting detail: OUTPATIENT SURGERY
Discharge: HOME | End: 2020-06-22
Attending: UROLOGY | Admitting: UROLOGY
Payer: OTHER GOVERNMENT

## 2020-06-22 VITALS
WEIGHT: 170.6 LBS | SYSTOLIC BLOOD PRESSURE: 95 MMHG | TEMPERATURE: 97.9 F | DIASTOLIC BLOOD PRESSURE: 51 MMHG | OXYGEN SATURATION: 99 % | RESPIRATION RATE: 16 BRPM | HEIGHT: 69 IN | BODY MASS INDEX: 25.27 KG/M2 | HEART RATE: 61 BPM

## 2020-06-22 VITALS — DIASTOLIC BLOOD PRESSURE: 46 MMHG | OXYGEN SATURATION: 97 % | SYSTOLIC BLOOD PRESSURE: 74 MMHG

## 2020-06-22 PROCEDURE — 6360000002 HC RX W HCPCS: Performed by: NURSE ANESTHETIST, CERTIFIED REGISTERED

## 2020-06-22 PROCEDURE — 7100000011 HC PHASE II RECOVERY - ADDTL 15 MIN: Performed by: UROLOGY

## 2020-06-22 PROCEDURE — 2580000003 HC RX 258: Performed by: UROLOGY

## 2020-06-22 PROCEDURE — 3700000000 HC ANESTHESIA ATTENDED CARE: Performed by: UROLOGY

## 2020-06-22 PROCEDURE — 55700 PR BIOPSY OF PROSTATE,NEEDLE/PUNCH: CPT | Performed by: UROLOGY

## 2020-06-22 PROCEDURE — 76998 US GUIDE INTRAOP: CPT

## 2020-06-22 PROCEDURE — 3700000001 HC ADD 15 MINUTES (ANESTHESIA): Performed by: UROLOGY

## 2020-06-22 PROCEDURE — 2709999900 HC NON-CHARGEABLE SUPPLY: Performed by: UROLOGY

## 2020-06-22 PROCEDURE — 6360000002 HC RX W HCPCS: Performed by: UROLOGY

## 2020-06-22 PROCEDURE — 2500000003 HC RX 250 WO HCPCS: Performed by: NURSE ANESTHETIST, CERTIFIED REGISTERED

## 2020-06-22 PROCEDURE — 7100000010 HC PHASE II RECOVERY - FIRST 15 MIN: Performed by: UROLOGY

## 2020-06-22 PROCEDURE — 2580000003 HC RX 258: Performed by: NURSE ANESTHETIST, CERTIFIED REGISTERED

## 2020-06-22 PROCEDURE — 88305 TISSUE EXAM BY PATHOLOGIST: CPT

## 2020-06-22 PROCEDURE — 3600000002 HC SURGERY LEVEL 2 BASE: Performed by: UROLOGY

## 2020-06-22 PROCEDURE — 3600000012 HC SURGERY LEVEL 2 ADDTL 15MIN: Performed by: UROLOGY

## 2020-06-22 PROCEDURE — 76872 US TRANSRECTAL: CPT | Performed by: UROLOGY

## 2020-06-22 RX ORDER — CIPROFLOXACIN 2 MG/ML
INJECTION, SOLUTION INTRAVENOUS
Status: DISCONTINUED
Start: 2020-06-22 | End: 2020-06-22 | Stop reason: HOSPADM

## 2020-06-22 RX ORDER — GENTAMICIN SULFATE 80 MG/100ML
INJECTION, SOLUTION INTRAVENOUS
Status: DISCONTINUED
Start: 2020-06-22 | End: 2020-06-22 | Stop reason: HOSPADM

## 2020-06-22 RX ORDER — SODIUM CHLORIDE 9 MG/ML
INJECTION, SOLUTION INTRAVENOUS CONTINUOUS
Status: DISCONTINUED | OUTPATIENT
Start: 2020-06-22 | End: 2020-06-22 | Stop reason: HOSPADM

## 2020-06-22 RX ORDER — MORPHINE SULFATE 2 MG/ML
2 INJECTION, SOLUTION INTRAMUSCULAR; INTRAVENOUS EVERY 5 MIN PRN
Status: DISCONTINUED | OUTPATIENT
Start: 2020-06-22 | End: 2020-06-22 | Stop reason: HOSPADM

## 2020-06-22 RX ORDER — MEPERIDINE HYDROCHLORIDE 25 MG/ML
12.5 INJECTION INTRAMUSCULAR; INTRAVENOUS; SUBCUTANEOUS EVERY 5 MIN PRN
Status: DISCONTINUED | OUTPATIENT
Start: 2020-06-22 | End: 2020-06-22 | Stop reason: HOSPADM

## 2020-06-22 RX ORDER — HYDRALAZINE HYDROCHLORIDE 20 MG/ML
5 INJECTION INTRAMUSCULAR; INTRAVENOUS EVERY 10 MIN PRN
Status: DISCONTINUED | OUTPATIENT
Start: 2020-06-22 | End: 2020-06-22 | Stop reason: HOSPADM

## 2020-06-22 RX ORDER — CIPROFLOXACIN 2 MG/ML
400 INJECTION, SOLUTION INTRAVENOUS
Status: COMPLETED | OUTPATIENT
Start: 2020-06-22 | End: 2020-06-22

## 2020-06-22 RX ORDER — FENTANYL CITRATE 50 UG/ML
INJECTION, SOLUTION INTRAMUSCULAR; INTRAVENOUS PRN
Status: DISCONTINUED | OUTPATIENT
Start: 2020-06-22 | End: 2020-06-22 | Stop reason: SDUPTHER

## 2020-06-22 RX ORDER — ONDANSETRON 2 MG/ML
4 INJECTION INTRAMUSCULAR; INTRAVENOUS
Status: DISCONTINUED | OUTPATIENT
Start: 2020-06-22 | End: 2020-06-22 | Stop reason: HOSPADM

## 2020-06-22 RX ORDER — MIDAZOLAM HYDROCHLORIDE 1 MG/ML
INJECTION INTRAMUSCULAR; INTRAVENOUS PRN
Status: DISCONTINUED | OUTPATIENT
Start: 2020-06-22 | End: 2020-06-22 | Stop reason: SDUPTHER

## 2020-06-22 RX ORDER — LIDOCAINE HYDROCHLORIDE 20 MG/ML
INJECTION, SOLUTION INTRAVENOUS PRN
Status: DISCONTINUED | OUTPATIENT
Start: 2020-06-22 | End: 2020-06-22 | Stop reason: SDUPTHER

## 2020-06-22 RX ORDER — PROPOFOL 10 MG/ML
INJECTION, EMULSION INTRAVENOUS PRN
Status: DISCONTINUED | OUTPATIENT
Start: 2020-06-22 | End: 2020-06-22 | Stop reason: SDUPTHER

## 2020-06-22 RX ORDER — GENTAMICIN SULFATE 80 MG/100ML
80 INJECTION, SOLUTION INTRAVENOUS
Status: CANCELLED | OUTPATIENT
Start: 2020-06-22 | End: 2020-06-22

## 2020-06-22 RX ORDER — SODIUM CHLORIDE 9 MG/ML
INJECTION, SOLUTION INTRAVENOUS CONTINUOUS PRN
Status: DISCONTINUED | OUTPATIENT
Start: 2020-06-22 | End: 2020-06-22 | Stop reason: SDUPTHER

## 2020-06-22 RX ORDER — GENTAMICIN SULFATE 40 MG/ML
80 INJECTION, SOLUTION INTRAMUSCULAR; INTRAVENOUS ONCE
Status: DISCONTINUED | OUTPATIENT
Start: 2020-06-22 | End: 2020-06-22

## 2020-06-22 RX ORDER — DIPHENHYDRAMINE HYDROCHLORIDE 50 MG/ML
12.5 INJECTION INTRAMUSCULAR; INTRAVENOUS
Status: DISCONTINUED | OUTPATIENT
Start: 2020-06-22 | End: 2020-06-22 | Stop reason: HOSPADM

## 2020-06-22 RX ORDER — EPHEDRINE SULFATE/0.9% NACL/PF 50 MG/5 ML
SYRINGE (ML) INTRAVENOUS PRN
Status: DISCONTINUED | OUTPATIENT
Start: 2020-06-22 | End: 2020-06-22 | Stop reason: SDUPTHER

## 2020-06-22 RX ORDER — LABETALOL 20 MG/4 ML (5 MG/ML) INTRAVENOUS SYRINGE
5 EVERY 5 MIN PRN
Status: DISCONTINUED | OUTPATIENT
Start: 2020-06-22 | End: 2020-06-22 | Stop reason: HOSPADM

## 2020-06-22 RX ORDER — FENTANYL CITRATE 50 UG/ML
50 INJECTION, SOLUTION INTRAMUSCULAR; INTRAVENOUS EVERY 5 MIN PRN
Status: DISCONTINUED | OUTPATIENT
Start: 2020-06-22 | End: 2020-06-22 | Stop reason: HOSPADM

## 2020-06-22 RX ORDER — GENTAMICIN SULFATE 40 MG/ML
INJECTION, SOLUTION INTRAMUSCULAR; INTRAVENOUS PRN
Status: DISCONTINUED | OUTPATIENT
Start: 2020-06-22 | End: 2020-06-22 | Stop reason: SDUPTHER

## 2020-06-22 RX ADMIN — SODIUM CHLORIDE: 9 INJECTION, SOLUTION INTRAVENOUS at 09:18

## 2020-06-22 RX ADMIN — LIDOCAINE HYDROCHLORIDE 50 MG: 20 INJECTION, SOLUTION INTRAVENOUS at 09:22

## 2020-06-22 RX ADMIN — Medication 10 MG: at 09:41

## 2020-06-22 RX ADMIN — CIPROFLOXACIN 400 MG: 2 INJECTION, SOLUTION INTRAVENOUS at 09:23

## 2020-06-22 RX ADMIN — Medication 10 MG: at 09:45

## 2020-06-22 RX ADMIN — PROPOFOL 50 MG: 10 INJECTION, EMULSION INTRAVENOUS at 09:29

## 2020-06-22 RX ADMIN — PROPOFOL 50 MG: 10 INJECTION, EMULSION INTRAVENOUS at 09:24

## 2020-06-22 RX ADMIN — SODIUM CHLORIDE: 9 INJECTION, SOLUTION INTRAVENOUS at 09:02

## 2020-06-22 RX ADMIN — PROPOFOL 50 MG: 10 INJECTION, EMULSION INTRAVENOUS at 09:22

## 2020-06-22 RX ADMIN — FENTANYL CITRATE 50 MCG: 50 INJECTION, SOLUTION INTRAMUSCULAR; INTRAVENOUS at 09:22

## 2020-06-22 RX ADMIN — FENTANYL CITRATE 50 MCG: 50 INJECTION, SOLUTION INTRAMUSCULAR; INTRAVENOUS at 09:24

## 2020-06-22 RX ADMIN — MIDAZOLAM HYDROCHLORIDE 2 MG: 1 INJECTION, SOLUTION INTRAMUSCULAR; INTRAVENOUS at 09:16

## 2020-06-22 RX ADMIN — PROPOFOL 50 MG: 10 INJECTION, EMULSION INTRAVENOUS at 09:26

## 2020-06-22 RX ADMIN — GENTAMICIN SULFATE 80 MG: 40 INJECTION, SOLUTION INTRAMUSCULAR; INTRAVENOUS at 09:23

## 2020-06-22 ASSESSMENT — PULMONARY FUNCTION TESTS
PIF_VALUE: 0
PIF_VALUE: 1
PIF_VALUE: 0
PIF_VALUE: 1
PIF_VALUE: 0

## 2020-06-22 ASSESSMENT — PAIN SCALES - GENERAL
PAINLEVEL_OUTOF10: 0
PAINLEVEL_OUTOF10: 0

## 2020-06-22 NOTE — OP NOTE
[address] [place] [address]                                OPERATIVE REPORT    PATIENT NAME: Dora Hayes                  :        1955  MED REC NO:   [id number]                           ROOM:  ACCOUNT NO:   [de-identified]                           ADMIT DATE: 2020  PROVIDER:     Lawyer Umair M.D.    DATE OF PROCEDURE:  2020    PREOPERATIVE DIAGNOSIS:  Elevated PSA. POSTOPERATIVE DIAGNOSIS:  Elevated PSA. PROCEDURES PERFORMED:  1. Transrectal ultrasound of the prostate. 2.  Ultrasound-guided prostate biopsy. ANESTHESIA:  MAC. SURGEON:  Lawyer Umair MD    INDICATIONS:  This is a 29-year-old white male who was found to have a  PSA of approximately 4.3 mg/mL and it is rising. It was recommended he  undergo transrectal ultrasound with ultrasound-guided prostate biopsies. This was attempted in the office, but the anal sphincter would not relax  enough to pass a transrectal ultrasound probe into the rectum. As such,  he now comes in to have this done under monitored anesthetic care. The  procedure, success rate, common side effects, and potential  complications were reviewed with the patient once again and he agrees to  proceed. OPERATIVE NOTE:  After informed consent was signed and the patient  properly identified, the patient was taken to the operating room where  his vital signs were monitored. He was then placed in the left lateral  decubitus position. He was given monitored anesthetic care. Once he  was heavily sedated, a 9.5 MHz prostate ultrasound probe was inserted  atraumatically into the rectum. Scanning was performed from the seminal  vesicle to the apex of the prostate. The seminal vesicles were  symmetric. No abnormalities were seen.   Transverse and longitudinal  scanning of the prostate revealed no hypoechoic lesions in the  peripheral zone, a few calcifications in the right

## 2020-06-22 NOTE — ANESTHESIA PRE PROCEDURE
Department of Anesthesiology  Preprocedure Note       Name:  Tonya Yo   Age:  60 y.o.  :  1955                                          MRN:  [id number]         Date:  2020      Surgeon: Onelia Dunn):  Miguelina Meza MD    Procedure: Procedure(s):  TRANSRECTAL ULTRASOUND WITH PROSTATE  BIOPSY    Medications prior to admission:   Prior to Admission medications    Medication Sig Start Date End Date Taking? Authorizing Provider   Coenzyme Q10 (CO Q 10) 100 MG CAPS Take 1 capsule by mouth daily   Yes Historical Provider, MD   Saw [place] 500 MG CAPS Take 1 tablet by mouth daily   Yes Historical Provider, MD   Misc Natural Products (LUTEIN 20) CAPS Take 1 tablet by mouth daily   Yes Historical Provider, MD   multivitamin [facility]) per tablet Take 1 tablet by mouth daily. Yes Historical Provider, MD   vitamin B-12 (CYANOCOBALAMIN) 1000 MCG tablet Take 1,000 mcg by mouth daily. Yes Historical Provider, MD   FISH OIL Take 1 capsule by mouth daily    Yes Historical Provider, MD       Current medications:    No current facility-administered medications for this encounter.         Allergies:  No Known Allergies    Problem List:    Patient Active Problem List   Diagnosis Code    Elevated PSA R97.20    Hypertrophy of prostate without urinary obstruction N40.0       Past Medical History:        Diagnosis Date    BPH (benign prostatic hyperplasia)     GERD (gastroesophageal reflux disease)     Headache(784.0)     Schatzki's ring     Varicose veins        Past Surgical History:        Procedure Laterality Date    COLONOSCOPY   and  2015    fam  hx  of  sibling    nanci     KNEE ARTHROSCOPY Left        Social History:    Social History     Tobacco Use    Smoking status: Never Smoker    Smokeless tobacco: Never Used   Substance Use Topics    Alcohol use: Yes     Comment: Moderate on weekends but none the last month                                Counseling given: Not Neuro/Psych:               GI/Hepatic/Renal:   (+) GERD:,           Endo/Other:                     Abdominal:           Vascular:                                        Anesthesia Plan      general     ASA 2       Induction: intravenous. Anesthetic plan and risks discussed with patient. Plan discussed with CRNA.                   Ethan Almanza MD   6/22/2020

## 2020-06-23 ENCOUNTER — TELEPHONE (OUTPATIENT)
Dept: UROLOGY | Age: 65
End: 2020-06-23

## 2020-06-23 NOTE — TELEPHONE ENCOUNTER
Patient advised prostate biopsies showed no cancer. He voiced understanding and follow up appointment scheduled in 6 months.

## 2020-10-12 ENCOUNTER — APPOINTMENT (OUTPATIENT)
Dept: GENERAL RADIOLOGY | Age: 65
End: 2020-10-12
Payer: OTHER GOVERNMENT

## 2020-10-12 ENCOUNTER — HOSPITAL ENCOUNTER (EMERGENCY)
Age: 65
Discharge: HOME | End: 2020-10-12
Payer: OTHER GOVERNMENT

## 2020-10-12 VITALS
BODY MASS INDEX: 26.66 KG/M2 | RESPIRATION RATE: 19 BRPM | HEART RATE: 68 BPM | TEMPERATURE: 98 F | OXYGEN SATURATION: 98 % | DIASTOLIC BLOOD PRESSURE: 88 MMHG | WEIGHT: 180 LBS | HEIGHT: 69 IN | SYSTOLIC BLOOD PRESSURE: 138 MMHG

## 2020-10-12 LAB
ABO: NORMAL
ANION GAP SERPL CALCULATED.3IONS-SCNC: 10 MEQ/L (ref 8–16)
ANTIBODY SCREEN: NORMAL
BASOPHILS # BLD: 0.6 %
BASOPHILS ABSOLUTE: 0 THOU/MM3 (ref 0–0.1)
BUN BLDV-MCNC: 13 MG/DL (ref 7–22)
CALCIUM SERPL-MCNC: 9.3 MG/DL (ref 8.5–10.5)
CHLORIDE BLD-SCNC: 103 MEQ/L (ref 98–111)
CO2: 27 MEQ/L (ref 23–33)
CREAT SERPL-MCNC: 1 MG/DL (ref 0.4–1.2)
EOSINOPHIL # BLD: 1.5 %
EOSINOPHILS ABSOLUTE: 0.1 THOU/MM3 (ref 0–0.4)
ERYTHROCYTE [DISTWIDTH] IN BLOOD BY AUTOMATED COUNT: 11.9 % (ref 11.5–14.5)
ERYTHROCYTE [DISTWIDTH] IN BLOOD BY AUTOMATED COUNT: 37.6 FL (ref 35–45)
GFR SERPL CREATININE-BSD FRML MDRD: 75 ML/MIN/1.73M2
GLUCOSE BLD-MCNC: 99 MG/DL (ref 70–108)
HCT VFR BLD CALC: 44.8 % (ref 42–52)
HEMOGLOBIN: 15.3 GM/DL (ref 14–18)
IMMATURE GRANS (ABS): 0.02 THOU/MM3 (ref 0–0.07)
IMMATURE GRANULOCYTES: 0.3 %
LYMPHOCYTES # BLD: 26.2 %
LYMPHOCYTES ABSOLUTE: 1.8 THOU/MM3 (ref 1–4.8)
MCH RBC QN AUTO: 30.2 PG (ref 26–33)
MCHC RBC AUTO-ENTMCNC: 34.2 GM/DL (ref 32.2–35.5)
MCV RBC AUTO: 88.5 FL (ref 80–94)
MONOCYTES # BLD: 8.2 %
MONOCYTES ABSOLUTE: 0.5 THOU/MM3 (ref 0.4–1.3)
NUCLEATED RED BLOOD CELLS: 0 /100 WBC
OSMOLALITY CALCULATION: 279.5 MOSMOL/KG (ref 275–300)
PLATELET # BLD: 275 THOU/MM3 (ref 130–400)
PMV BLD AUTO: 9 FL (ref 9.4–12.4)
POTASSIUM REFLEX MAGNESIUM: 4.5 MEQ/L (ref 3.5–5.2)
RBC # BLD: 5.06 MILL/MM3 (ref 4.7–6.1)
RH FACTOR: NORMAL
SEG NEUTROPHILS: 63.2 %
SEGMENTED NEUTROPHILS ABSOLUTE COUNT: 4.2 THOU/MM3 (ref 1.8–7.7)
SODIUM BLD-SCNC: 140 MEQ/L (ref 135–145)
WBC # BLD: 6.7 THOU/MM3 (ref 4.8–10.8)

## 2020-10-12 PROCEDURE — 70360 X-RAY EXAM OF NECK: CPT

## 2020-10-12 PROCEDURE — 85025 COMPLETE CBC W/AUTO DIFF WBC: CPT

## 2020-10-12 PROCEDURE — 86900 BLOOD TYPING SEROLOGIC ABO: CPT

## 2020-10-12 PROCEDURE — 86850 RBC ANTIBODY SCREEN: CPT

## 2020-10-12 PROCEDURE — 80048 BASIC METABOLIC PNL TOTAL CA: CPT

## 2020-10-12 PROCEDURE — 71046 X-RAY EXAM CHEST 2 VIEWS: CPT

## 2020-10-12 PROCEDURE — 36415 COLL VENOUS BLD VENIPUNCTURE: CPT

## 2020-10-12 PROCEDURE — 86901 BLOOD TYPING SEROLOGIC RH(D): CPT

## 2020-10-12 PROCEDURE — 99283 EMERGENCY DEPT VISIT LOW MDM: CPT

## 2020-10-12 PROCEDURE — 99282 EMERGENCY DEPT VISIT SF MDM: CPT

## 2020-10-12 ASSESSMENT — ENCOUNTER SYMPTOMS
COLOR CHANGE: 0
SINUS PAIN: 0
VOICE CHANGE: 0
DIARRHEA: 0
VOMITING: 0
BACK PAIN: 0
NAUSEA: 0
SHORTNESS OF BREATH: 0
SORE THROAT: 0
TROUBLE SWALLOWING: 1
ABDOMINAL PAIN: 0
SINUS PRESSURE: 0
COUGH: 0
ABDOMINAL DISTENTION: 0

## 2020-10-12 NOTE — ED PROVIDER NOTES
Serena Yi 13 COMPLAINT       Chief Complaint   Patient presents with    Other     Chicken stuck in throat        Nurses Notes reviewed and I agree except as noted in the HPI. HISTORY OF PRESENT ILLNESS    Krysta Calvo is a 60 y.o. male who presents to the Emergency Department for the evaluation of   Difficulty swallowing. Patient states that he has been having difficulty swallowing for approximately 5 days now. He states that he feels like he has something stuck in his esophagus he. He states that initially he thought the symptoms were getting better but since yesterday evening he has only been able to swallow liquids and nothing solid. He is currently able to swallow clear liquids. No fever, body aches or chills. No abdominal pain. He states he has a full sensation subxiphoid. No chest pain. Patient has a history of Schatzki's ring and has had esophageal dilatation approximately 30 years ago. The HPI was provided by the patient. REVIEW OF SYSTEMS     Review of Systems   Constitutional: Negative for fatigue and fever. HENT: Positive for trouble swallowing. Negative for drooling, sinus pressure, sinus pain, sore throat and voice change. Respiratory: Negative for cough and shortness of breath. Cardiovascular: Negative for chest pain and leg swelling. Gastrointestinal: Negative for abdominal distention, abdominal pain, diarrhea, nausea and vomiting. Musculoskeletal: Negative for back pain. Skin: Negative for color change. Neurological: Negative for dizziness, weakness and light-headedness. Psychiatric/Behavioral: Negative for behavioral problems. PAST MEDICAL HISTORY    has a past medical history of BPH (benign prostatic hyperplasia), GERD (gastroesophageal reflux disease), Headache(784.0), Schatzki's ring, and Varicose veins.     SURGICAL HISTORY      has a past surgical history that includes Knee arthroscopy (Left, 1998); Colonoscopy (2007 and  2015 2018); and Ultrasound Prostate/Transrectal (N/A, 6/22/2020). CURRENT MEDICATIONS       Discharge Medication List as of 10/12/2020 11:28 AM      CONTINUE these medications which have NOT CHANGED    Details   Coenzyme Q10 (CO Q 10) 100 MG CAPS Take 1 capsule by mouth dailyHistorical Med      Saw Palmetto 500 MG CAPS Take 1 tablet by mouth dailyHistorical Med      Misc Natural Products (LUTEIN 20) CAPS DAILY, Until Discontinued, Historical Med      multivitamin (THERAGRAN) per tablet Take 1 tablet by mouth daily. vitamin B-12 (CYANOCOBALAMIN) 1000 MCG tablet Take 1,000 mcg by mouth daily. FISH OIL Take 1 capsule by mouth daily Historical Med             ALLERGIES     has No Known Allergies. FAMILY HISTORY     He indicated that the status of his mother is unknown. He indicated that the status of his father is unknown. He indicated that his sister is alive. He indicated that his brother is alive. family history includes Cancer in his brother and sister; Diabetes in his father; Heart Disease in his mother; Stroke in his mother. SOCIAL HISTORY      reports that he has never smoked. He has never used smokeless tobacco. He reports previous alcohol use. He reports that he does not use drugs. PHYSICAL EXAM     INITIAL VITALS:  height is 5' 9\" (1.753 m) and weight is 180 lb (81.6 kg). His temperature is 98 °F (36.7 °C). His blood pressure is 138/88 and his pulse is 68. His respiration is 19 and oxygen saturation is 98%. Physical Exam  Constitutional:       General: He is not in acute distress. Appearance: He is normal weight. He is not ill-appearing. HENT:      Head: Normocephalic. Nose: Nose normal.      Mouth/Throat:      Mouth: Mucous membranes are moist.   Eyes:      Pupils: Pupils are equal, round, and reactive to light. Cardiovascular:      Rate and Rhythm: Normal rate and regular rhythm. Pulses: Normal pulses.       Heart sounds: Normal heart sounds. No murmur. Pulmonary:      Effort: Pulmonary effort is normal.      Breath sounds: Normal breath sounds. Abdominal:      General: Abdomen is flat. Bowel sounds are normal. There is no distension. Tenderness: There is no abdominal tenderness. There is no guarding. Skin:     General: Skin is warm and dry. Capillary Refill: Capillary refill takes less than 2 seconds. Neurological:      General: No focal deficit present. Mental Status: He is alert and oriented to person, place, and time. Psychiatric:         Mood and Affect: Mood normal.         Behavior: Behavior normal.          DIFFERENTIAL DIAGNOSIS:   Esophageal food bolus, esophageal stricture, dysphagia    DIAGNOSTIC RESULTS     EKG: All EKG's are interpreted by the Emergency Department Physician who either signs or Co-signs this chart in the absence of a cardiologist.    None    RADIOLOGY: non-plainfilm images(s) such as CT, Ultrasound and MRI are read by the radiologist.    XR NECK SOFT TISSUE   Final Result   1. No radiopaque foreign body noted. Final report electronically signed by DR Thomas Brandon on 10/12/2020 10:38 AM      XR CHEST (2 VW)   Final Result   1. There is no acute cardiopulmonary process. 2. There is no radiopaque foreign body. **This report has been created using voice recognition software. It may contain minor errors which are inherent in voice recognition technology. **      Final report electronically signed by Dr. Delores Lewis on 10/12/2020 10:36 AM          LABS:     Labs Reviewed   CBC WITH AUTO DIFFERENTIAL - Abnormal; Notable for the following components:       Result Value    MPV 9.0 (*)     All other components within normal limits   GLOMERULAR FILTRATION RATE, ESTIMATED - Abnormal; Notable for the following components:    Est, Glom Filt Rate 75 (*)     All other components within normal limits   BASIC METABOLIC PANEL W/ REFLEX TO MG FOR LOW K   ANION GAP OSMOLALITY   TYPE AND SCREEN       EMERGENCY DEPARTMENT COURSE:   Vitals:    Vitals:    10/12/20 1001 10/12/20 1116   BP: (!) 169/94 138/88   Pulse: 67 68   Resp: 19 19   Temp: 98 °F (36.7 °C)    SpO2: 99% 98%   Weight: 180 lb (81.6 kg)    Height: 5' 9\" (1.753 m)        11:00 AM EDT: The patient was seen and evaluated. I performed a swallow test and patient was able to swallow sips of water. Patient is not drooling. He is keeping clear liquids down just no solids. I contacted Dr. Neisha Daniel, the patient's gastroenterologist.  I advised him of the patient's concerns. Since he is able to swallow secretions and swallow clear liquids, the patient will have EGD performed tomorrow morning. He advised to have the patient consume only clear liquids tonight and he will have scope first thing in the morning. Nothing to eat or drink after midnight tonight    MDM:  Patient will be discharged today. Clear liquids only. Follow-up for endoscopy tomorrow. Patient will return for inability to swallow secretions, chest pain, shortness of breath, fever, inability to swallow clear liquids, or any new concerns. CRITICAL CARE:   [facility]    CONSULTS:  Dr Felix Aguirre, GI    PROCEDURES:  none    FINAL IMPRESSION      1. Esophageal dysphagia          DISPOSITION/PLAN   Discharge    PATIENT REFERRED TO:  Va Olson MD  . [address]    In 1 day  the office will call you today for an appointment time tomorrow      DISCHARGE MEDICATIONS:  Discharge Medication List as of 10/12/2020 11:28 AM          (Please note that portions of this note were completed with a voice recognition program.  Efforts were made to edit the dictations but occasionally words are mis-transcribed.)    The patient was given an opportunity to see the Emergency Attending. The patient voiced understanding that I was a Mid-LevelProvider and was in agreement with being seen independently by myself.           Darshana Quintanilla APRN - CNP  10/12/20 Jenny Curtis, APRN - CNP  10/21/20 [id number]

## 2020-10-12 NOTE — ED TRIAGE NOTES
Pt presents to the ED with c/o chicken stuck in his throat since Thursday. Pt reports he has had difficulty swallowing since.  Pt reports he can get water down but has had difficulty

## 2020-10-14 ENCOUNTER — CARE COORDINATION (OUTPATIENT)
Dept: FAMILY MEDICINE CLINIC | Age: 65
End: 2020-10-14

## 2020-10-14 RX ORDER — OMEPRAZOLE 10 MG/1
10 CAPSULE, DELAYED RELEASE ORAL DAILY
COMMUNITY
Start: 2020-10-13 | End: 2020-12-24

## 2020-10-14 NOTE — CARE COORDINATION
Ambulatory Care Coordination  ED Follow up Call    Reason for ED visit:  Unable to swallow anything except liquids   Status:     improved    Did you call your PCP prior to going to the ED? Not Applicable      Did you receive a discharge instructions from the Emergency Room? Yes  Review of Instructions:     Understands what to report/when to return?:  Yes   Understands discharge instructions?:  Yes   Following discharge instructions?:  Yes   If not why? Are there any new complaints of pain? No  New Pain Meds? No    Constipation prophylaxis needed? N/A    If you have a wound is the dressing clean, dry, and intact? N/A  Understands wound care regimen? N/A    Are there any other complaints/concerns that you wish to tell your provider? No    FU appts/Provider:    Future Appointments   Date Time Provider Rommel Fermin   12/29/2020  8:30 AM Aung Portillo MD [address] [facility] [address]     I spoke with him and hesaw Dr Zarina Shaffer yesterday and sais said that he goes back to Dr Zarina Shaffer on November 4 ,2020 to have his esophagus stretched. He is feeling better and I reconciled his medications. New Medications?:   Yes      Medication Reconciliation by phone - Yes  Understands Medications? Yes  Taking Medications? Yes  Can you swallow your pills? Yes    Any further needs in the home i.e. Equipment?   No    Link to services in community?:  No   Which services:

## 2020-10-18 ENCOUNTER — TELEPHONE (OUTPATIENT)
Dept: FAMILY MEDICINE CLINIC | Age: 65
End: 2020-10-18

## 2020-10-19 ENCOUNTER — HOSPITAL ENCOUNTER (OUTPATIENT)
Age: 65
Setting detail: SPECIMEN
Discharge: HOME | End: 2020-10-19
Payer: OTHER GOVERNMENT

## 2020-10-19 PROCEDURE — U0003 INFECTIOUS AGENT DETECTION BY NUCLEIC ACID (DNA OR RNA); SEVERE ACUTE RESPIRATORY SYNDROME CORONAVIRUS 2 (SARS-COV-2) (CORONAVIRUS DISEASE [COVID-19]), AMPLIFIED PROBE TECHNIQUE, MAKING USE OF HIGH THROUGHPUT TECHNOLOGIES AS DESCRIBED BY CMS-2020-01-R: HCPCS

## 2020-10-21 LAB — SARS-COV-2: NOT DETECTED

## 2020-10-22 ENCOUNTER — TELEPHONE (OUTPATIENT)
Dept: FAMILY MEDICINE CLINIC | Age: 65
End: 2020-10-22

## 2020-10-22 NOTE — TELEPHONE ENCOUNTER
----- Message from Tommie Weir MD sent at 10/22/2020  5:33 AM EDT -----  Call and inform at this time negative covid

## 2020-12-24 ENCOUNTER — OFFICE VISIT (OUTPATIENT)
Dept: UROLOGY | Age: 65
End: 2020-12-24
Payer: OTHER GOVERNMENT

## 2020-12-24 VITALS — WEIGHT: 178.1 LBS | BODY MASS INDEX: 26.38 KG/M2 | HEIGHT: 69 IN

## 2020-12-24 LAB
BILIRUBIN URINE: NEGATIVE
BLOOD URINE, POC: ABNORMAL
CHARACTER, URINE: CLEAR
COLOR, URINE: YELLOW
GLUCOSE URINE: NEGATIVE MG/DL
KETONES, URINE: NEGATIVE
LEUKOCYTE CLUMPS, URINE: ABNORMAL
NITRITE, URINE: NEGATIVE
PH, URINE: 7 (ref 5–9)
PROTEIN, URINE: NEGATIVE MG/DL
SPECIFIC GRAVITY, URINE: 1.02 (ref 1–1.03)
UROBILINOGEN, URINE: 0.2 EU/DL (ref 0–1)

## 2020-12-24 PROCEDURE — 99213 OFFICE O/P EST LOW 20 MIN: CPT | Performed by: UROLOGY

## 2020-12-24 PROCEDURE — 81003 URINALYSIS AUTO W/O SCOPE: CPT | Performed by: UROLOGY

## 2020-12-24 RX ORDER — OMEPRAZOLE 40 MG/1
20 CAPSULE, DELAYED RELEASE ORAL DAILY
COMMUNITY
Start: 2020-12-09

## 2020-12-24 NOTE — PROGRESS NOTES
81-year-old white male returns today for a follow-up. His PSA was rising in June, 2020. He underwent ultrasound-guided prostate biopsies. Fortunately, there was no malignancy found. He has not had a PSA since 4/29/2020 when it was found to be 4.17 NG/mL. He has varying symptoms of NF and stream variation. Exam: wnwd male alert and oriented x 3. KIARA: normal sphincter tone. Prostate is smooth, symmetric, no nodules. UA: wnl  He will get psa with PCP. If normal, see prn. In excess of 15 minutes was spent with the patient discussing their medical history, treatment outcomes and possible treatment related side effects. Greater than 50 per cent of this time was spent in face to face discussion of current disease status and ongoing management.

## 2021-02-15 ENCOUNTER — OFFICE VISIT (OUTPATIENT)
Dept: FAMILY MEDICINE CLINIC | Age: 66
End: 2021-02-15

## 2021-02-15 VITALS
HEIGHT: 69 IN | HEART RATE: 66 BPM | DIASTOLIC BLOOD PRESSURE: 80 MMHG | TEMPERATURE: 95.7 F | SYSTOLIC BLOOD PRESSURE: 130 MMHG | WEIGHT: 185 LBS | BODY MASS INDEX: 27.4 KG/M2 | RESPIRATION RATE: 16 BRPM

## 2021-02-15 DIAGNOSIS — K21.9 GASTROESOPHAGEAL REFLUX DISEASE, UNSPECIFIED WHETHER ESOPHAGITIS PRESENT: ICD-10-CM

## 2021-02-15 DIAGNOSIS — R97.20 ELEVATED PSA, LESS THAN 10 NG/ML: ICD-10-CM

## 2021-02-15 DIAGNOSIS — N40.0 HYPERTROPHY OF PROSTATE WITHOUT URINARY OBSTRUCTION: ICD-10-CM

## 2021-02-15 DIAGNOSIS — K63.5 POLYP OF COLON, UNSPECIFIED PART OF COLON, UNSPECIFIED TYPE: ICD-10-CM

## 2021-02-15 DIAGNOSIS — Z00.00 WELLNESS EXAMINATION: Primary | ICD-10-CM

## 2021-02-15 PROCEDURE — 99397 PER PM REEVAL EST PAT 65+ YR: CPT | Performed by: FAMILY MEDICINE

## 2021-02-15 ASSESSMENT — ENCOUNTER SYMPTOMS
BACK PAIN: 0
SHORTNESS OF BREATH: 0
TROUBLE SWALLOWING: 0
CHEST TIGHTNESS: 0
ABDOMINAL PAIN: 0
NAUSEA: 0
BLOOD IN STOOL: 0
COUGH: 0
CONSTIPATION: 0
SORE THROAT: 0
EYE PAIN: 0

## 2021-02-15 ASSESSMENT — PATIENT HEALTH QUESTIONNAIRE - PHQ9
SUM OF ALL RESPONSES TO PHQ QUESTIONS 1-9: 0
SUM OF ALL RESPONSES TO PHQ9 QUESTIONS 1 & 2: 0
1. LITTLE INTEREST OR PLEASURE IN DOING THINGS: 0

## 2021-02-15 NOTE — PROGRESS NOTES
CARE VISIT    Ronal Sever  YOB: 1955    Date of Service:  2/15/2021    Chief Complaint:   Ronal Sever is a 73 y.o. male who presents for Comprehensive Annual Evaluation    Patient Active Problem List    Diagnosis Date Noted    Elevated PSA 03/05/2020    Hypertrophy of prostate without urinary obstruction 03/05/2020    Raised prostate specific antigen 03/05/2020    Hyperplasia of prostate 03/05/2020       psa  Up and  Biopsied        esophageal  Ulceration  noted       Preventive Care:  Last eye exam:   Exercise:   Stable and  daily  Fracture within the past 6 months: no      Living will:  yes,           gerd  Stable  And  egd  With  Esophageal  Foreign  Body       Review of Systems   Constitutional: Negative for fatigue and fever. HENT: Negative for congestion, ear pain, postnasal drip, sore throat and trouble swallowing. Eyes: Negative for pain. Respiratory: Negative for cough, chest tightness and shortness of breath. Cardiovascular: Negative for chest pain, palpitations and leg swelling. Gastrointestinal: Negative for abdominal pain, blood in stool, constipation and nausea. Genitourinary: Negative for difficulty urinating, frequency and urgency. Musculoskeletal: Negative for arthralgias, back pain, joint swelling and neck stiffness. Skin: Negative for rash. Neurological: Negative for dizziness, weakness and headaches. Hematological: Negative for adenopathy. Does not bruise/bleed easily. Psychiatric/Behavioral: Negative for behavioral problems, dysphoric mood and sleep disturbance. No Known Allergies  Prior to Visit Medications    Medication Sig Taking?  Authorizing Provider   omeprazole (PRILOSEC) 40 MG delayed release capsule Take 1 capsule by mouth daily Yes Historical Provider, MD   Coenzyme Q10 (CO Q 10) 100 MG CAPS Take 1 capsule by mouth daily Yes Historical Provider, MD Saw Palmetto 500 MG CAPS Take 1 tablet by mouth daily Yes Historical Provider, MD   Misc Natural Products (LUTEIN 20) CAPS Take 1 tablet by mouth daily Yes Historical Provider, MD   multivitamin [facility]) per tablet Take 1 tablet by mouth daily. Yes Historical Provider, MD   vitamin B-12 (CYANOCOBALAMIN) 1000 MCG tablet Take 1,000 mcg by mouth daily. Yes Historical Provider, MD   FISH OIL Take 1 capsule by mouth daily Do not take until after follow up with Dr Bria Haney.  Yes Historical Provider, MD       Past Medical History:   Diagnosis Date    BPH (benign prostatic hyperplasia)     Esophagitis, acute 12/2020    [facility]  egd    GERD (gastroesophageal reflux disease)     Headache(784.0)     Schatzki's ring     Varicose veins      Past Surgical History:   Procedure Laterality Date    COLONOSCOPY  2007 and  2015  2018    fam  hx  of  sibling    [facility]     KNEE ARTHROSCOPY Left 1998    ULTRASOUND PROSTATE/TRANSRECTAL N/A 06/22/2020    TRANSRECTAL ULTRASOUND WITH PROSTATE  BIOPSY performed by Rusty Lloyd MD at [address] N/A 11/2020    done on  and   [facility]     Family History   Problem Relation Age of Onset    Cancer Sister         colon  cancer    Cancer Brother         colon cancer    Heart Disease Mother     Stroke Mother     Diabetes Father      Social History     Socioeconomic History    Marital status:      Spouse name: Not on file    Number of children: Not on file    Years of education: Not on file    Highest education level: Not on file   Occupational History    Not on file   Social Needs    Financial resource strain: Not on file    Food insecurity     Worry: Not on file     Inability: Not on file    Transportation needs     Medical: Not on file     Non-medical: Not on file   Tobacco Use    Smoking status: Never Smoker    Smokeless tobacco: Never Used   Substance and Sexual Activity    Alcohol use: Not Currently Comment: Moderate on weekends but none the last month    Drug use: No    Sexual activity: Yes     Partners: Female   Lifestyle    Physical activity     Days per week: Not on file     Minutes per session: Not on file    Stress: Not on file   Relationships    Social connections     Talks on phone: Not on file     Gets together: Not on file     Attends Caodaism service: Not on file     Active member of club or organization: Not on file     Attends meetings of clubs or organizations: Not on file     Relationship status: Not on file    Intimate partner violence     Fear of current or ex partner: Not on file     Emotionally abused: Not on file     Physically abused: Not on file     Forced sexual activity: Not on file   Other Topics Concern    Not on file   Social History Narrative    Not on file       Wt Readings from Last 3 Encounters:   02/15/21 185 lb (83.9 kg)   12/24/20 178 lb 1.6 oz (80.8 kg)   10/12/20 180 lb (81.6 kg)     BP Readings from Last 3 Encounters:   02/15/21 130/80   10/12/20 138/88   06/22/20 (!) 95/51        Vitals:    02/15/21 1038   BP: 130/80   Site: Right Upper Arm   Position: Sitting   Cuff Size: Medium Adult   Pulse: 66   Resp: 16   Temp: 95.7 °F (35.4 °C)   TempSrc: Skin   Weight: 185 lb (83.9 kg)   Height: 5' 9\" (1.753 m)     Body mass index is 27.32 kg/m². Physical Exam  Vitals signs and nursing note reviewed. Constitutional:       Appearance: He is well-developed. HENT:      Head: Normocephalic and atraumatic. Right Ear: External ear normal.      Left Ear: External ear normal.      Nose: Nose normal.   Eyes:      Conjunctiva/sclera: Conjunctivae normal.      Pupils: Pupils are equal, round, and reactive to light. Comments: Fundi nl   Neck:      Musculoskeletal: Normal range of motion and neck supple. Thyroid: No thyromegaly. Cardiovascular:      Rate and Rhythm: Normal rate and regular rhythm. Heart sounds: Normal heart sounds.    Pulmonary: Effort: Pulmonary effort is normal.      Breath sounds: Normal breath sounds. No wheezing or rales. Abdominal:      General: Bowel sounds are normal.      Palpations: Abdomen is soft. There is no mass. Tenderness: There is no abdominal tenderness. Musculoskeletal: Normal range of motion. Lymphadenopathy:      Cervical: No cervical adenopathy. Skin:     General: Skin is warm and dry. Findings: No rash. Neurological:      Mental Status: He is alert and oriented to person, place, and time. Cranial Nerves: No cranial nerve deficit. Deep Tendon Reflexes: Reflexes are normal and symmetric. Lipid panel:   Lab Results   Component Value Date    CHOL 163 02/20/2020    TRIG 70 02/20/2020    HDL 44 02/20/2020    LDLCALC 105 02/20/2020     Glucose:   Glucose (mg/dL)   Date Value   10/12/2020 99   02/20/2020 93       Patient Care Team:  Agnieszka Villalta MD as PCP - General (Family Medicine)  Agnieszka Villalta MD as PCP - [facility] Provider      There is no immunization history on file for this patient. Health Maintenance Due   Topic Date Due    Hepatitis C screen  1955    HIV screen  01/14/1971    COVID-19 Vaccine (1 of 2) 01/14/1972    DTaP/Tdap/Td vaccine (1 - Tdap) 01/14/1975    Shingles Vaccine (1 of 2) 01/14/2006    Flu vaccine (1) 09/01/2020    Pneumococcal 65+ years Vaccine (1 of 1 - PPSV23) 01/14/2021        Assessment/Plan:   Diagnosis Orders   1. Wellness examination  TSH with Reflex    Lipid Panel    Comprehensive Metabolic Panel    CBC Auto Differential    PSA, Prostatic Specific Antigen   2. Elevated PSA, less than 10 ng/ml  PSA, Prostatic Specific Antigen   3. Polyp of colon, unspecified part of colon, unspecified type  TSH with Reflex    Comprehensive Metabolic Panel    CBC Auto Differential   4. Hypertrophy of prostate without urinary obstruction     5.  Gastroesophageal reflux disease, unspecified whether esophagitis present       PLAn

## 2021-02-16 LAB
ABSOLUTE BASO #: 0 X10E9/L (ref 0–0.2)
ABSOLUTE EOS #: 0.1 X10E9/L (ref 0–0.4)
ABSOLUTE LYMPH #: 1.9 X10E9/L (ref 1–3.5)
ABSOLUTE MONO #: 0.5 X10E9/L (ref 0–0.9)
ABSOLUTE NEUT #: 2.6 X10E9/L (ref 1.5–6.6)
ALBUMIN SERPL-MCNC: 4.4 G/DL (ref 3.2–5.3)
ALK PHOSPHATASE: 73 U/L (ref 39–130)
ALT SERPL-CCNC: 20 U/L (ref 0–40)
ANION GAP SERPL CALCULATED.3IONS-SCNC: 7 MMOL/L (ref 5–15)
AST SERPL-CCNC: 20 U/L (ref 0–41)
BASOPHILS RELATIVE PERCENT: 0.8 %
BILIRUB SERPL-MCNC: 1.4 MG/DL (ref 0.3–1.2)
BUN BLDV-MCNC: 13 MG/DL (ref 5–27)
CALCIUM SERPL-MCNC: 9.4 MG/DL (ref 8.5–10.5)
CHLORIDE BLD-SCNC: 103 MMOL/L (ref 98–109)
CHOLESTEROL/HDL RATIO: 4 (ref 1–5)
CHOLESTEROL: 164 MG/DL (ref 150–200)
CO2: 30 MMOL/L (ref 22–32)
CREAT SERPL-MCNC: 1.04 MG/DL (ref 0.6–1.3)
EGFR AFRICAN AMERICAN: >60 ML/MIN/1.73SQ.M
EGFR IF NONAFRICAN AMERICAN: >60 ML/MIN/1.73SQ.M
EOSINOPHILS RELATIVE PERCENT: 1.6 %
GLUCOSE: 95 MG/DL (ref 65–99)
HCT VFR BLD CALC: 47.2 % (ref 39–49)
HDLC SERPL-MCNC: 41 MG/DL
HEMOGLOBIN: 15.7 G/DL (ref 13–17)
LDL CHOLESTEROL CALCULATED: 95 MG/DL
LDL/HDL RATIO: 2.3
LYMPHOCYTE %: 37.4 %
MCH RBC QN AUTO: 29.8 PG (ref 27–34)
MCHC RBC AUTO-ENTMCNC: 33.2 G/DL (ref 32–36)
MCV RBC AUTO: 90 FL (ref 80–100)
MONOCYTES # BLD: 9.2 %
NEUTROPHILS RELATIVE PERCENT: 51 %
PDW BLD-RTO: 13.1 % (ref 11.5–15)
PLATELETS: 282 X10E9/L (ref 150–450)
PMV BLD AUTO: 7.5 FL (ref 7–12)
POTASSIUM SERPL-SCNC: 4.9 MMOL/L (ref 3.5–5)
PSA, ULTRASENSITIVE: 3.91 NG/ML (ref 0–4)
RBC: 5.26 X10E12/L (ref 4.1–5.7)
SODIUM BLD-SCNC: 140 MMOL/L (ref 134–146)
TOTAL PROTEIN: 7 G/DL (ref 6–8)
TRIGL SERPL-MCNC: 138 MG/DL (ref 27–150)
TSH SERPL DL<=0.05 MIU/L-ACNC: 2.69 UIU/ML (ref 0.49–4.67)
VLDLC SERPL CALC-MCNC: 28 MG/DL (ref 0–30)
WBC: 5 X10E9/L (ref 4–11)

## 2021-02-17 ENCOUNTER — TELEPHONE (OUTPATIENT)
Dept: FAMILY MEDICINE CLINIC | Age: 66
End: 2021-02-17

## 2021-02-17 NOTE — TELEPHONE ENCOUNTER
----- Message from Mark Goetz MD sent at 2/17/2021  4:42 AM EST -----  Call as labs look good and chol 164 and ldl at 95 so less 100 and what we want.  psa is better the 5.05 down to 3.91 so much better  And sure they will watch

## 2021-06-16 ENCOUNTER — TELEPHONE (OUTPATIENT)
Dept: UROLOGY | Age: 66
End: 2021-06-16

## 2021-06-16 NOTE — TELEPHONE ENCOUNTER
From: Yoegsh Jarrett  To: Olga Ray APRN - CNP  Sent: 6/14/2021  7:48 PM EDT  Subject: Test Results Question    Received a letter from insurance of a denial for Procedure 58736 - [employer] for Prostate Cancer. (Molecular Genomic Testing). They claim that the test is \"investigational and experimental\" and never approved for any reason. Trying to find out some information in order to consider an appeal of the denial as I don't remember the test.    Thank You,  Narciso Shen    I have never ordered [employer] for Prostate Cancer. I would call our representative and see what this is about and how the situation could be rectified.

## 2021-06-17 NOTE — TELEPHONE ENCOUNTER
Jolene Gentile the rep called and said they are in the process of an appeal. They will work with him on this. If he would like he could also call billing at [phone] Carmen Teixeira) ext [phone]. Or call nakita on her cell. She said they are working on this but if he wanted to he could call the number above if he felt more comfortable doing this. Lm in regards to message.

## 2022-02-15 ENCOUNTER — OFFICE VISIT (OUTPATIENT)
Dept: FAMILY MEDICINE CLINIC | Age: 67
End: 2022-02-15

## 2022-02-15 VITALS
SYSTOLIC BLOOD PRESSURE: 134 MMHG | DIASTOLIC BLOOD PRESSURE: 84 MMHG | TEMPERATURE: 96 F | RESPIRATION RATE: 16 BRPM | WEIGHT: 178.5 LBS | BODY MASS INDEX: 26.44 KG/M2 | HEART RATE: 64 BPM | HEIGHT: 69 IN

## 2022-02-15 DIAGNOSIS — R97.20 ELEVATED PSA, LESS THAN 10 NG/ML: ICD-10-CM

## 2022-02-15 DIAGNOSIS — K63.5 POLYP OF COLON, UNSPECIFIED PART OF COLON, UNSPECIFIED TYPE: ICD-10-CM

## 2022-02-15 DIAGNOSIS — Z00.00 WELLNESS EXAMINATION: Primary | ICD-10-CM

## 2022-02-15 DIAGNOSIS — K21.9 GASTROESOPHAGEAL REFLUX DISEASE WITHOUT ESOPHAGITIS: ICD-10-CM

## 2022-02-15 LAB
HEMOCCULT STL QL: NEGATIVE
HEMOCCULT STL QL: NORMAL
HEMOCCULT STL QL: NORMAL

## 2022-02-15 PROCEDURE — 99397 PER PM REEVAL EST PAT 65+ YR: CPT | Performed by: FAMILY MEDICINE

## 2022-02-15 PROCEDURE — 82270 OCCULT BLOOD FECES: CPT | Performed by: FAMILY MEDICINE

## 2022-02-15 SDOH — ECONOMIC STABILITY: FOOD INSECURITY: WITHIN THE PAST 12 MONTHS, THE FOOD YOU BOUGHT JUST DIDN'T LAST AND YOU DIDN'T HAVE MONEY TO GET MORE.: NEVER TRUE

## 2022-02-15 SDOH — ECONOMIC STABILITY: FOOD INSECURITY: WITHIN THE PAST 12 MONTHS, YOU WORRIED THAT YOUR FOOD WOULD RUN OUT BEFORE YOU GOT MONEY TO BUY MORE.: NEVER TRUE

## 2022-02-15 ASSESSMENT — ENCOUNTER SYMPTOMS
COUGH: 0
NAUSEA: 0
BLOOD IN STOOL: 0
TROUBLE SWALLOWING: 0
SHORTNESS OF BREATH: 0
EYE PAIN: 0
BACK PAIN: 0
CHEST TIGHTNESS: 0
SORE THROAT: 0
ABDOMINAL PAIN: 0
CONSTIPATION: 0

## 2022-02-15 ASSESSMENT — SOCIAL DETERMINANTS OF HEALTH (SDOH): HOW HARD IS IT FOR YOU TO PAY FOR THE VERY BASICS LIKE FOOD, HOUSING, MEDICAL CARE, AND HEATING?: NOT HARD AT ALL

## 2022-02-15 NOTE — PROGRESS NOTES
CARE VISIT    Deann Louie  YOB: 1955    Date of Service:  2/15/2022    Chief Complaint:   Deann Louie is a 78 y.o. male who presents for Comprehensive Annual Evaluation    Patient Active Problem List    Diagnosis Date Noted    Elevated PSA 03/05/2020    Hypertrophy of prostate without urinary obstruction 03/05/2020    Raised prostate specific antigen 03/05/2020    Hyperplasia of prostate 03/05/2020       egd  In  Nov  As  schatsky  Ring   Preventive Care:  Last eye exam:   2022  Due   Exercise:   walking   Fracture within the past 6 months: no      gerd  stable  Living will:  yes,    Power  Of  Health and power  Of    Colonoscopy    2018      For  5  Years  In  2023  Review of Systems   Constitutional: Negative for fatigue and fever. HENT: Negative for congestion, ear pain, postnasal drip, sore throat and trouble swallowing. Eyes: Negative for pain. Respiratory: Negative for cough, chest tightness and shortness of breath. Cardiovascular: Negative for chest pain, palpitations and leg swelling. Gastrointestinal: Negative for abdominal pain, blood in stool, constipation and nausea. Genitourinary: Negative for difficulty urinating, frequency and urgency. Musculoskeletal: Negative for arthralgias, back pain, joint swelling and neck stiffness. Skin: Negative for rash. Neurological: Negative for dizziness, weakness and headaches. Hematological: Negative for adenopathy. Does not bruise/bleed easily. Psychiatric/Behavioral: Negative for behavioral problems, dysphoric mood and sleep disturbance. No Known Allergies  Prior to Visit Medications    Medication Sig Taking?  Authorizing Provider   omeprazole (PRILOSEC) 40 MG delayed release capsule Take 20 mg by mouth daily  Yes Historical Provider, MD   Coenzyme Q10 (CO Q 10) 100 MG CAPS Take 1 capsule by mouth daily Yes Historical Provider, MD   Saw [place] 500 MG CAPS Take 1 tablet by mouth daily Yes Historical Provider, MD   Misc Natural Products (LUTEIN 20) CAPS Take 1 tablet by mouth daily Yes Historical Provider, MD   multivitamin [facility]) per tablet Take 1 tablet by mouth daily. Yes Historical Provider, MD   vitamin B-12 (CYANOCOBALAMIN) 1000 MCG tablet Take 1,000 mcg by mouth daily. Yes Historical Provider, MD   FISH OIL Take 1 capsule by mouth daily Do not take until after follow up with Dr Prince Singh.  Yes Historical Provider, MD       Past Medical History:   Diagnosis Date    BPH (benign prostatic hyperplasia)     Esophagitis, acute 12/2020    [facility]  egd    GERD (gastroesophageal reflux disease)     Headache(784.0)     Schatzki's ring     Varicose veins      Past Surgical History:   Procedure Laterality Date    COLONOSCOPY  2007 and  2015  2018    fam  hx  of  sibling    [facility]     KNEE ARTHROSCOPY Left 1998    ULTRASOUND PROSTATE/TRANSRECTAL N/A 06/22/2020    TRANSRECTAL ULTRASOUND WITH PROSTATE  BIOPSY performed by Gavin Pineda MD at [address] N/A 11/2020    done on  and   [facility]     Family History   Problem Relation Age of Onset    Cancer Sister         colon  cancer    Cancer Brother         colon cancer    Heart Disease Mother     Stroke Mother     Diabetes Father      Social History     Socioeconomic History    Marital status:      Spouse name: Not on file    Number of children: Not on file    Years of education: Not on file    Highest education level: Not on file   Occupational History    Not on file   Tobacco Use    Smoking status: Never Smoker    Smokeless tobacco: Never Used   Substance and Sexual Activity    Alcohol use: Not Currently     Comment: Moderate on weekends but none the last month    Drug use: No    Sexual activity: Yes     Partners: Female   Other Topics Concern    Not on file   Social History Narrative    Not on file     Social Determinants of Health     Financial Resource Strain: Low Risk     Difficulty of Paying Living Expenses: Not hard at all   Food Insecurity: No Food Insecurity    Worried About Running Out of Food in the Last Year: Never true    Ran Out of Food in the Last Year: Never true   Transportation Needs:     Lack of Transportation (Medical): Not on file    Lack of Transportation (Non-Medical): Not on file   Physical Activity:     Days of Exercise per Week: Not on file    Minutes of Exercise per Session: Not on file   Stress:     Feeling of Stress : Not on file   Social Connections:     Frequency of Communication with Friends and Family: Not on file    Frequency of Social Gatherings with Friends and Family: Not on file    Attends Yazidism Services: Not on file    Active Member of [address] [place] [address] [employer] or Organizations: Not on file    Attends Club or Organization Meetings: Not on file    Marital Status: Not on file   Intimate Partner Violence:     Fear of Current or Ex-Partner: Not on file    Emotionally Abused: Not on file    Physically Abused: Not on file    Sexually Abused: Not on file   Housing Stability:     Unable to Pay for Housing in the Last Year: Not on file    Number of [place] in the Last Year: Not on file    Unstable Housing in the Last Year: Not on file       Wt Readings from Last 3 Encounters:   02/15/22 178 lb 8 oz (81 kg)   02/15/21 185 lb (83.9 kg)   12/24/20 178 lb 1.6 oz (80.8 kg)     BP Readings from Last 3 Encounters:   02/15/22 134/84   02/15/21 130/80   10/12/20 138/88        Vitals:    02/15/22 0902   BP: 134/84   Site: Right Upper Arm   Position: Sitting   Cuff Size: Medium Adult   Pulse: 64   Resp: 16   Temp: 96 °F (35.6 °C)   TempSrc: Infrared   Weight: 178 lb 8 oz (81 kg)   Height: 5' 9\" (1.753 m)     Body mass index is 26.36 kg/m². Physical Exam  Vitals and nursing note reviewed. Constitutional:       Appearance: He is well-developed. HENT:      Head: Normocephalic and atraumatic.       Right Ear: External ear normal.      Left Ear: External ear normal.      Nose: Nose normal.   Eyes:      Conjunctiva/sclera: Conjunctivae normal.      Pupils: Pupils are equal, round, and reactive to light. Comments: Fundi nl   Neck:      Thyroid: No thyromegaly. Cardiovascular:      Rate and Rhythm: Normal rate and regular rhythm. Heart sounds: Normal heart sounds. Pulmonary:      Effort: Pulmonary effort is normal.      Breath sounds: Normal breath sounds. No wheezing or rales. Abdominal:      General: Bowel sounds are normal.      Palpations: Abdomen is soft. There is no mass. Tenderness: There is no abdominal tenderness. Hernia: There is no hernia in the left inguinal area or right inguinal area. Genitourinary:     Penis: Normal and circumcised. Testes: Normal.         Right: Mass or tenderness not present. Left: Mass or tenderness not present. Epididymis:      Right: Normal.      Left: Normal.      Prostate: Enlarged (  2  +  no  masses). Not tender and no nodules present. Rectum: Guaiac result negative. No mass, tenderness, anal fissure, external hemorrhoid or internal hemorrhoid. Abnormal anal tone (  ?  scar  tissue  6  o clock area ). Musculoskeletal:         General: Normal range of motion. Cervical back: Normal range of motion and neck supple. Lymphadenopathy:      Cervical: No cervical adenopathy. Skin:     General: Skin is warm and dry. Findings: No rash. Neurological:      Mental Status: He is alert and oriented to person, place, and time. Cranial Nerves: No cranial nerve deficit. Deep Tendon Reflexes: Reflexes are normal and symmetric.          Lipid panel:   Lab Results   Component Value Date    CHOL 164 02/15/2021    TRIG 138 02/15/2021    HDL 41 02/15/2021    LDLCALC 95 02/15/2021     Glucose:   Glucose (mg/dL)   Date Value   02/15/2021 95       Patient Care Team:  Hany Rinaldi MD as PCP - General (Family Medicine)  Hany Rinaldi MD as PCP - [facility] Provider    Immunization History   Administered Date(s) Administered    COVID-19, Hector Booker, Primary or Immunocompromised, PF, 100mcg/0.5mL 02/26/2021, 03/29/2021, 12/13/2021       Health Maintenance Due   Topic Date Due    Hepatitis C screen  Never done    DTaP/Tdap/Td vaccine (1 - Tdap) Never done    Shingles Vaccine (1 of 2) Never done    Pneumococcal 65+ years Vaccine (1 of 1 - PPSV23) Never done    Flu vaccine (1) Never done    Depression Screen  02/15/2022    PSA counseling  02/15/2022        Assessment     Diagnosis Orders   1. Wellness examination  TSH with Reflex    Lipid Panel   2. Gastroesophageal reflux disease without esophagitis     3. Polyp of colon, unspecified part of colon, unspecified type  Comprehensive Metabolic Panel    CBC Auto Differential    POCT occult blood stool   4. Elevated PSA, less than 10 ng/ml  PSA, Prostatic Specific Antigen           Koha Gee:      Current Outpatient Medications   Medication Sig Dispense Refill    omeprazole (PRILOSEC) 40 MG delayed release capsule Take 20 mg by mouth daily       Coenzyme Q10 (CO Q 10) 100 MG CAPS Take 1 capsule by mouth daily      Saw Palmetto 500 MG CAPS Take 1 tablet by mouth daily      Misc Natural Products (LUTEIN 20) CAPS Take 1 tablet by mouth daily      multivitamin (THERAGRAN) per tablet Take 1 tablet by mouth daily.  vitamin B-12 (CYANOCOBALAMIN) 1000 MCG tablet Take 1,000 mcg by mouth daily.  FISH OIL Take 1 capsule by mouth daily Do not take until after follow up with Dr Benson Bernal. No current facility-administered medications for this visit. There are no diagnoses linked to this encounter.    Orders Placed This Encounter   Procedures    TSH with Reflex     Standing Status:   Future     Standing Expiration Date:   2/15/2023    Lipid Panel     Standing Status:   Future     Standing Expiration Date:   2/15/2023     Order Specific Question:   Is Patient Fasting?/# of Hours     Answer:   YES 12 HOURS    Comprehensive Metabolic Panel     Standing Status:   Future     Standing Expiration Date:   2/15/2023    CBC Auto Differential     Standing Status:   Future     Standing Expiration Date:   2/15/2023    PSA, Prostatic Specific Antigen     Standing Status:   Future     Standing Expiration Date:   2/15/2023    POCT occult blood stool     Results for orders placed or performed in visit on 02/15/22   POCT occult blood stool   Result Value Ref Range    OCCULT BLOOD FECAL negative     OCCULT BLOOD FECAL      OCCULT BLOOD FECAL       No follow-ups on file.

## 2022-02-16 ENCOUNTER — TELEPHONE (OUTPATIENT)
Dept: FAMILY MEDICINE CLINIC | Age: 67
End: 2022-02-16

## 2022-02-16 DIAGNOSIS — R97.20 ELEVATED PROSTATE SPECIFIC ANTIGEN LESS THAN 10 NG/ML: ICD-10-CM

## 2022-02-16 DIAGNOSIS — R97.20 ELEVATED PSA, LESS THAN 10 NG/ML: Primary | ICD-10-CM

## 2022-02-16 DIAGNOSIS — E78.00 PURE HYPERCHOLESTEROLEMIA: ICD-10-CM

## 2022-02-16 LAB
ABSOLUTE BASO #: 0 X10E9/L (ref 0–0.2)
ABSOLUTE EOS #: 0.1 X10E9/L (ref 0–0.4)
ABSOLUTE LYMPH #: 1.9 X10E9/L (ref 1–3.5)
ABSOLUTE MONO #: 0.4 X10E9/L (ref 0–0.9)
ABSOLUTE NEUT #: 2 X10E9/L (ref 1.5–6.6)
ALBUMIN SERPL-MCNC: 4.6 G/DL (ref 3.2–5.3)
ALK PHOSPHATASE: 70 U/L (ref 39–130)
ALT SERPL-CCNC: 17 U/L (ref 0–40)
ANION GAP SERPL CALCULATED.3IONS-SCNC: 8 MMOL/L (ref 5–15)
AST SERPL-CCNC: 18 U/L (ref 0–41)
BASOPHILS RELATIVE PERCENT: 0.7 %
BILIRUB SERPL-MCNC: 1.3 MG/DL (ref 0.3–1.2)
BUN BLDV-MCNC: 14 MG/DL (ref 5–27)
CALCIUM SERPL-MCNC: 9.5 MG/DL (ref 8.5–10.5)
CHLORIDE BLD-SCNC: 103 MMOL/L (ref 98–109)
CHOLESTEROL/HDL RATIO: 4 (ref 1–5)
CHOLESTEROL: 190 MG/DL (ref 150–200)
CO2: 29 MMOL/L (ref 22–32)
CREAT SERPL-MCNC: 1.1 MG/DL (ref 0.6–1.3)
EGFR AFRICAN AMERICAN: >60 ML/MIN/1.73SQ.M
EGFR IF NONAFRICAN AMERICAN: >60 ML/MIN/1.73SQ.M
EOSINOPHILS RELATIVE PERCENT: 1.4 %
GLUCOSE: 97 MG/DL (ref 65–99)
HCT VFR BLD CALC: 46.7 % (ref 39–49)
HDLC SERPL-MCNC: 48 MG/DL
HEMOGLOBIN: 15.4 G/DL (ref 13–17)
LDL CHOLESTEROL CALCULATED: 129 MG/DL
LDL/HDL RATIO: 2.7
LYMPHOCYTE %: 43.9 %
MCH RBC QN AUTO: 29.2 PG (ref 27–34)
MCHC RBC AUTO-ENTMCNC: 33 G/DL (ref 32–36)
MCV RBC AUTO: 89 FL (ref 80–100)
MONOCYTES # BLD: 8.4 %
NEUTROPHILS RELATIVE PERCENT: 45.6 %
PDW BLD-RTO: 13 % (ref 11.5–15)
PLATELETS: 293 X10E9/L (ref 150–450)
PMV BLD AUTO: 7.3 FL (ref 7–12)
POTASSIUM SERPL-SCNC: 4.7 MMOL/L (ref 3.5–5)
PSA, ULTRASENSITIVE: 5.89 NG/ML (ref 0–4)
RBC: 5.27 X10E12/L (ref 4.1–5.7)
SODIUM BLD-SCNC: 140 MMOL/L (ref 134–146)
TOTAL PROTEIN: 6.9 G/DL (ref 6–8)
TRIGL SERPL-MCNC: 67 MG/DL (ref 27–150)
TSH SERPL DL<=0.05 MIU/L-ACNC: 3.38 UIU/ML (ref 0.49–4.67)
VLDLC SERPL CALC-MCNC: 13 MG/DL (ref 0–30)
WBC: 4.4 X10E9/L (ref 4–11)

## 2022-02-16 RX ORDER — SULFAMETHOXAZOLE AND TRIMETHOPRIM 800; 160 MG/1; MG/1
1 TABLET ORAL 2 TIMES DAILY
Qty: 28 TABLET | Refills: 0 | Status: SHIPPED | OUTPATIENT
Start: 2022-02-16 | End: 2022-06-28

## 2022-02-16 NOTE — TELEPHONE ENCOUNTER
----- Message from Leonard Clifford MD sent at 2/16/2022  9:35 AM EST -----  Watch  cholesterol so up from 164 to 190.   Other lab ok   but psa 3.91 to 5.89 so cn be slight increase low grade infection and prostate exam can sometimes effect so   bactrim bid for 2 weeks an  repeat chol and psa in 3  mths

## 2022-06-03 LAB
CHOLESTEROL: 181 MG/DL
PSA, ULTRASENSITIVE: 5.5 NG/ML

## 2022-06-06 ENCOUNTER — TELEPHONE (OUTPATIENT)
Dept: FAMILY MEDICINE CLINIC | Age: 67
End: 2022-06-06

## 2022-06-06 DIAGNOSIS — R97.20 ELEVATED PSA: Primary | ICD-10-CM

## 2022-06-06 NOTE — TELEPHONE ENCOUNTER
[facility] called back- Dr Renetta Morton retired. Internal Referral complete, they will contact the patient with an appt day and time.  (Dr Lady Perales)     MOM to return call to office

## 2022-06-06 NOTE — TELEPHONE ENCOUNTER
Patient informed and stated he had seen Dr. Johanna Tomas previously. Please call him with referral information.

## 2022-06-06 NOTE — TELEPHONE ENCOUNTER
----- Message from Noelle Webb MD sent at 6/5/2022  3:32 PM EDT -----  Chol  better and watch diet      The psa was only slight better  was 5.89 as originally 3.91 then up to 5.89 and now down only to 5.50 as expected  to around low 4 range so feel need to see urology     Call and see if ok to see  [address] urology

## 2022-06-28 ENCOUNTER — TELEPHONE (OUTPATIENT)
Dept: UROLOGY | Age: 67
End: 2022-06-28

## 2022-06-28 ENCOUNTER — OFFICE VISIT (OUTPATIENT)
Dept: UROLOGY | Age: 67
End: 2022-06-28
Payer: OTHER GOVERNMENT

## 2022-06-28 VITALS
SYSTOLIC BLOOD PRESSURE: 122 MMHG | DIASTOLIC BLOOD PRESSURE: 74 MMHG | WEIGHT: 177 LBS | BODY MASS INDEX: 26.22 KG/M2 | HEIGHT: 69 IN

## 2022-06-28 DIAGNOSIS — N40.1 BENIGN LOCALIZED PROSTATIC HYPERPLASIA WITH LOWER URINARY TRACT SYMPTOMS (LUTS): ICD-10-CM

## 2022-06-28 DIAGNOSIS — R97.20 ELEVATED PSA: Primary | ICD-10-CM

## 2022-06-28 PROCEDURE — 1123F ACP DISCUSS/DSCN MKR DOCD: CPT | Performed by: UROLOGY

## 2022-06-28 PROCEDURE — 99214 OFFICE O/P EST MOD 30 MIN: CPT | Performed by: UROLOGY

## 2022-06-28 RX ORDER — VIT C/B6/B5/MAGNESIUM/HERB 173 50-5-6-5MG
CAPSULE ORAL DAILY
COMMUNITY

## 2022-06-28 NOTE — TELEPHONE ENCOUNTER
Patient scheduled for MRI PROSTATE W WO  at [facility] MR on 7/26/2022 ARRIVAL OF 730AM FOR AN 8AM SCAN. NO PREP. ORDER GIVEN TO PATIENT IN THE OFFICE.

## 2022-06-28 NOTE — PROGRESS NOTES
[facility] [id number]  Dept: [phone]  Dept Fax: [phone]: [address] [facility] Office Note -     Patient:  Lanora Cooks  YOB: 1955    The patient is a 78 y.o. male who presents today for evaluation of the following problems:   Chief Complaint   Patient presents with    Elevated PSA     prev sarmina pt         History of Present Illness:    Elevated PSA  Hx of biopsy with sarmina in 2020    BPH  Weak stream at times        Summary of Previous Records:  64-year-old white male returns today for a follow-up. His PSA was rising in June, 2020. He underwent ultrasound-guided prostate biopsies. Fortunately, there was no malignancy found. He has not had a PSA since 4/29/2020 when it was found to be 4.17 NG/mL. He has varying symptoms of NF and stream variation. Exam: wnwd male alert and oriented x 3. KIARA: normal sphincter tone. Prostate is smooth, symmetric, no nodules. UA: wnl  He will get psa with PCP. If normal, see prn. In excess of 15 minutes was spent with the patient discussing their medical history, treatment outcomes and possible treatment related side effects. Greater than 50 per cent of this time was spent in face to face discussion of current disease status and ongoing management. Requested/reviewed records from Jonatan Javier MD office and/or outside physician/EMR    (Patient's old records have been requested, reviewed and pertinent findings summarized in today's note.)    Procedures Today: N/A    Last several PSA's:  Lab Results   Component Value Date    PSA 4.17 (H) 04/29/2020       Last total testosterone:  No results found for: TESTOSTERONE    Urinalysis today:  No results found for this visit on 06/28/22.     Last BUN and creatinine:  Lab Results   Component Value Date    BUN 14 02/15/2022     Lab Results   Component Value Date    CREATININE 1.10 02/15/2022       Imaging Reviewed during this Office Visit:   Juan Cuadra MD independently reviewed the images and verified the radiology reports from:    No results found. PAST MEDICAL, FAMILY AND SOCIAL HISTORY:  Past Medical History:   Diagnosis Date    BPH (benign prostatic hyperplasia)     Esophagitis, acute 12/2020    [facility]  egd    GERD (gastroesophageal reflux disease)     Headache(784.0)     Schatzki's ring     Varicose veins      Past Surgical History:   Procedure Laterality Date    COLONOSCOPY  2007 and  2015 2018    fam  hx  of  sibling    [facility]     KNEE ARTHROSCOPY Left 1998    ULTRASOUND PROSTATE/TRANSRECTAL N/A 06/22/2020    TRANSRECTAL ULTRASOUND WITH PROSTATE  BIOPSY performed by Rahul Burgess MD at [address] N/A 11/2020    done on  and   [facility]     Family History   Problem Relation Age of Onset    Cancer Sister         colon  cancer    Cancer Brother         colon cancer    Heart Disease Mother     Stroke Mother     Diabetes Father      Outpatient Medications Marked as Taking for the 6/28/22 encounter (Office Visit) with Ese Bee MD   Medication Sig Dispense Refill    turmeric 500 MG CAPS Take by mouth daily      vitamin D 25 MCG (1000 UT) CAPS Take by mouth daily      Methylsulfonylmethane 1000 MG CAPS Take by mouth daily      omeprazole (PRILOSEC) 40 MG delayed release capsule Take 20 mg by mouth daily       Coenzyme Q10 (CO Q 10) 100 MG CAPS Take 1 capsule by mouth daily      Saw Palmetto 500 MG CAPS Take 1 tablet by mouth daily      multivitamin (THERAGRAN) per tablet Take 1 tablet by mouth daily.  vitamin B-12 (CYANOCOBALAMIN) 1000 MCG tablet Take 1,000 mcg by mouth daily. Patient has no known allergies.   Social History     Tobacco Use   Smoking Status Never Smoker   Smokeless Tobacco Never Used      (If patient a smoker, smoking cessation counseling offered)   Social History     Substance and Sexual Activity   Alcohol Use Not Currently    Comment: Moderate on weekends but none the last month       REVIEW OF SYSTEMS:  Constitutional: negative  Eyes: negative  Respiratory: negative  Cardiovascular: negative  Gastrointestinal: negative  Genitourinary: see HPI  Musculoskeletal: negative  Skin: negative   Neurological: negative  Hematological/Lymphatic: negative  Psychological: negative      Physical Exam:    This a 78 y.o. male  Vitals:    06/28/22 0757   BP: 122/74     Body mass index is 26.14 kg/m². Constitutional: Patient in no acute distress;         Assessment and Plan        1. Elevated PSA    2. Benign localized prostatic hyperplasia with lower urinary tract symptoms (LUTS)               Plan:      Elevated psa- still elevated and rising. > 5. Offered observation vs prostate MRI. Will get prostate mri in anticipation of biopsy if clinically significant lesions are present. BPH- stable symptoms but weak stream biggest complaint. Offered flomax, he will consider this. Prostate mri and telephone w results      Prescriptions Ordered:  No orders of the defined types were placed in this encounter. Orders Placed:  No orders of the defined types were placed in this encounter.            Jhonny Mcclellan MD

## 2022-07-26 ENCOUNTER — HOSPITAL ENCOUNTER (OUTPATIENT)
Dept: MRI IMAGING | Age: 67
Discharge: HOME | End: 2022-07-26
Payer: OTHER GOVERNMENT

## 2022-07-26 DIAGNOSIS — R97.20 ELEVATED PSA: ICD-10-CM

## 2022-07-26 LAB — POC CREATININE WHOLE BLOOD: 1 MG/DL (ref 0.5–1.2)

## 2022-07-26 PROCEDURE — A9579 GAD-BASE MR CONTRAST NOS,1ML: HCPCS | Performed by: UROLOGY

## 2022-07-26 PROCEDURE — 6360000004 HC RX CONTRAST MEDICATION: Performed by: UROLOGY

## 2022-07-26 PROCEDURE — 76377 3D RENDER W/INTRP POSTPROCES: CPT

## 2022-07-26 PROCEDURE — 82565 ASSAY OF CREATININE: CPT

## 2022-07-26 RX ADMIN — GADOTERIDOL 15 ML: 279.3 INJECTION, SOLUTION INTRAVENOUS at 09:05

## 2022-08-02 ENCOUNTER — SCHEDULED TELEPHONE ENCOUNTER (OUTPATIENT)
Dept: UROLOGY | Age: 67
End: 2022-08-02
Payer: MEDICARE

## 2022-08-02 PROCEDURE — 99442 PR PHYS/QHP TELEPHONE EVALUATION 11-20 MIN: CPT | Performed by: UROLOGY

## 2022-08-03 NOTE — PROGRESS NOTES
Lis Aponte is a 78 y.o. male evaluated via telephone on 8/2/2022 for mri results      Documentation:  I communicated with the patient and/or health care decision maker about . Details of this discussion including any medical advice provided:     imaging independently reviewed by Isabel Hameed MD and radiology report verified demonstrating     MRI PROSTATE W WO CONTRAST    Result Date: 7/26/2022  PROCEDURE: MRI PROSTATE W WO CONTRAST CLINICAL INFORMATION: Elevated PSA COMPARISON: None TECHNIQUE: Axial T2, coronal T2 and sagittal T2 imaging of the prostate gland. Large field of view axial T2 imaging of the prostate gland. Axial T1, axial T1 VIBE dynamic post stef and axial T1 VIBE dynamic subtracted post stef images through the prostate gland. Diffusion 50, 800, 1400 and ADC maps through the prostate gland. Axial T1 STAR VIBE post stef through the pelvis. 3-D images reconstructed on a separate [employer] Cad workstation with MRI TRUS fusion of prostate mass lesions. CONTRAST: 15 mL of ProHance  intravenously. LABORATORY: 1. PSA on 6/2/2022 was 5.5 ng/ml 2. PSA on 2/15/2022 was 5.89 ng/ml 3. PSA on 2/15/2021 was 3.91 ng/ml FINDINGS: PROSTATE SIZE: (As measured on Joyce cad workstation) 1. The prostate gland is moderately to markedly enlarged measuring 5.3 x 4.9 x 6 cm. 2. The prostate volume is 75.86 ml. TRANSITIONAL ZONE: 1. Enlarged and nodular transitional zone as can be seen with benign prostatic hypertrophy. PERIPHERAL ZONE: 1. Areas of intermediate T2 signal along with areas of T2 hyperintensity noted. PROSTATE LESIONS: 1. Lesion #: 1 Location: Right posterior peripheral zone at the apex of the prostate (series 4, image 16) Size: 1.6 x 1.1 cm T2: Hypointense ADC: Mildly hypointense DWI: Isointense DCE: Positive Prostate margin: Abuts the prostatic margin and extraprostatic extension is not excluded Overall PI-RADS category: 4 /5 2.  Lesion #: 2 Location: Right posterior transitional zone at the base of the prostate (series 4, image 10) Size: 1.4 x 1.4 cm T2: Hypointense ADC: Hypointense DWI: Hyperintense DCE: Positive Prostate margin: Intact Overall PI-RADS category: 4 /5 3. Lesion #: 3 Location: Right posterior transitional zone at the apex the prostate (series 4, image 15) Size: 1.3 x 0.8 cm T2: Hypointense ADC: Hypointense DWI: Isointense DCE: Positive Prostate margin: Intact Overall PI-RADS category: 3 /5 SEMINAL VESICLES: Unremarkable. NEUROVASCULAR BUNDLES: Involvement of the right neurovascular bundle cannot be excluded. URINARY BLADDER/RECTUM/PELVIC DIAPHRAGM: Circumferential bladder wall thickening as can be seen with chronic urinary retention. The rectum and urogenital diaphragm are unremarkable. PATHOLOGICALLY ENLARGED LYMPH NODES: 1. None. OSSEOUS STRUCTURES: 1. No suspicious osseous lesion. OTHER: 1. Colonic diverticulosis. 1. A 1.6 x 1.1 cm focal lesion in the right posterior peripheral zone at the apex of prostate is a PI-RADS 4 lesion. 2. A 1.4 x 1.4 cm focal lesion in the right posterior transitional zone at the base of the prostate is a PI-RADS 4 lesion 3. A 1.3 x 0.8 cm focal lesion in the right posterior transitional zone at the apex of the prostate is a PI-RADS 3 lesion. 4. Overall PI-RADS assessment is PI-RADS 4 5. Moderate to marked prostatomegaly. 6. Circumferential bladder wall thickening as can be seen with chronic urinary retention. 7. Right neurovascular bundle involvement cannot be excluded. 8. Enlarged and nodular transitional zone as can be seen with benign prostatic hypertrophy.   PI-RADS v 2.1 assessment categories PI-RADS 1: Very low (clinically significant cancer is highly unlikely to be present) PI-RADS 2: Low (clinically significant cancer is unlikely to be present) PI-RADS 3: Intermediate (the presence of clinically significant cancer is equivocal) PI-RADS 4: High (clinically significant cancer is likely to be present) PI-RADS-5: Very high (clinically significant cancer is highly likely to be present) **This report has been created using voice recognition software. It may contain minor errors which are inherent in voice recognition technology. ** Final report electronically signed by Dr Art Sanchez on 7/26/2022 10:00 AM      Total Time: minutes: 11-20 minutes      Plan:  Margarita Wahl biopsy    Mike Irizarry was evaluated through a synchronous (real-time) audio encounter. Patient identification was verified at the start of the visit. He (or guardian if applicable) is aware that this is a billable service, which includes applicable co-pays. This visit was conducted with the patient's (and/or legal guardian's) verbal consent. He has not had a related appointment within my department in the past 7 days or scheduled within the next 24 hours. The patient was located at Home: [address] [facility]. The provider was located at [facility] ([address] [place] [address]): [address] [place] [address] Rd  [address] [facility],  [address].     Note: not billable if this call serves to triage the patient into an appointment for the relevant concern    Tiff Olvera MD

## 2022-08-09 ENCOUNTER — SCHEDULED TELEPHONE ENCOUNTER (OUTPATIENT)
Dept: UROLOGY | Age: 67
End: 2022-08-09
Payer: MEDICARE

## 2022-08-09 PROCEDURE — 99442 PR PHYS/QHP TELEPHONE EVALUATION 11-20 MIN: CPT | Performed by: UROLOGY

## 2022-08-10 ENCOUNTER — TELEPHONE (OUTPATIENT)
Dept: UROLOGY | Age: 67
End: 2022-08-10

## 2022-08-10 RX ORDER — CIPROFLOXACIN 500 MG/1
500 TABLET, FILM COATED ORAL 2 TIMES DAILY
Qty: 6 TABLET | Refills: 0 | Status: SHIPPED | OUTPATIENT
Start: 2022-08-10 | End: 2022-08-13

## 2022-08-10 NOTE — TELEPHONE ENCOUNTER
Patient needsCipro sent in for [facility] with Dr Abdelrahman Nolasco on 8/23/22. Ordered and pended. Thanks.

## 2022-08-10 NOTE — TELEPHONE ENCOUNTER
URONAV PROSTATE ULTRASOUND/BIOPSY    Ezequiel Crumb [facility]      1955    You are scheduled for the above procedure on:    8/23/22   at:    1:00 pm  Your follow up appointment for your biopsy results is on:    8/30/22     At:   3:00 pm    Please note that you will be responsible for any charges that are not paid by your insurance. The prostate biopsy specimens are sent to a Lab and their charges are billed to you separate from the office charges. DESCRIPTION OF PROSTATIC ULTRASOUND AND BIOPSY  Ultrasound uses harmless sound waves to give us pictures of the prostate, and allows us to accurately guide a biopsy needle to areas of concern. The procedure is done in the office. Initially, a complete prostate exam is done. Next the ultrasound probe, finger-like in size and shape, is placed into the rectum. With slight movement of the probe, many different views are obtained. A prostate block may or may not be given at this time. A spring loaded fine needle is place through the probe and directed into the prostate. Six or more biopsies are usually taken. These biopsies are not usually painful. The entire exam takes 20-30 minutes. POSSIBLE RISKS OF THE PROCEDURE  Blood may be noted in the stool and urine for a few days. Blood may be seen in the semen for up to a month. Infection of the prostate or in the urine can occur even with antibiotic preparation. You should call if you develop fever, chills, severe pain, or have continuous or significant bleeding. If you have known hemorrhoids, you may have more bleeding and discomfort in the rectal area following the biopsy. This may last for 3-5 days afterwards. If any concerns arise, please call the office. PREPARATION** PLEASE EAT A REGULAR LUNCH OR BREAKFAST**      1.  DO NOT TAKE: ASPIRIN, MOTRIN,  IBUPROFEN, MOBIC/ MELOXICAM, COUMADIN( WARFARIN) FISH OIL, AND VITAMIN E FOR 5 DAYS BEFORE THE BIOPSY AND 3 DAYS AFTER THE BIOPSY.   YOU MAY TAKE TYLENOL IF NEEDED  2. Take your antibiotics as directed:  Cipro 500 mg twice a day, start on:   08/22/22    take until finished. 3.  Office to provide the Tobramycin an injectable antibiotic the day of the procedure . You will be given the injection once you are brought back to the room  4. Do a Fleets Enema 2 hours before the visit. Purchase it at any drug store and follow the instructions on the package. 5. Please ensure to bring a  with you the day of the surgery to transport you home. HOME GOING AND FOLLOW UP INSTRUCTIONS  Call the doctor if: 1. There is a large amount of blood or clots in the urine or stool. 2.  You are unable to urinate. 3.  If your temperature is 101 degrees F or greater. 4.  You could see blood in your semen for up to 2-months            5.   You may resume your regular medication 3-days after procedure    DATE: 8/10/2022       SIGNATURE:________________________________

## 2022-08-10 NOTE — PROGRESS NOTES
Michelle Hill is a 78 y.o. male evaluated via telephone on 8/9/2022 for elevated psa      Documentation:  I communicated with the patient and/or health care decision maker about elevated psa. Details of this discussion including any medical advice provided:     imaging independently reviewed by Fritz Lanes, MD and radiology report verified demonstrating     MRI PROSTATE W WO CONTRAST    Result Date: 7/26/2022  PROCEDURE: MRI PROSTATE W WO CONTRAST CLINICAL INFORMATION: Elevated PSA COMPARISON: None TECHNIQUE: Axial T2, coronal T2 and sagittal T2 imaging of the prostate gland. Large field of view axial T2 imaging of the prostate gland. Axial T1, axial T1 VIBE dynamic post stef and axial T1 VIBE dynamic subtracted post stef images through the prostate gland. Diffusion 50, 800, 1400 and ADC maps through the prostate gland. Axial T1 STAR VIBE post stef through the pelvis. 3-D images reconstructed on a separate T3 MOTION Cad workstation with MRI TRUS fusion of prostate mass lesions. CONTRAST: 15 mL of ProHance  intravenously. LABORATORY: 1. PSA on 6/2/2022 was 5.5 ng/ml 2. PSA on 2/15/2022 was 5.89 ng/ml 3. PSA on 2/15/2021 was 3.91 ng/ml FINDINGS: PROSTATE SIZE: (As measured on T3 MOTION cad workstation) 1. The prostate gland is moderately to markedly enlarged measuring 5.3 x 4.9 x 6 cm. 2. The prostate volume is 75.86 ml. TRANSITIONAL ZONE: 1. Enlarged and nodular transitional zone as can be seen with benign prostatic hypertrophy. PERIPHERAL ZONE: 1. Areas of intermediate T2 signal along with areas of T2 hyperintensity noted. PROSTATE LESIONS: 1. Lesion #: 1 Location: Right posterior peripheral zone at the apex of the prostate (series 4, image 16) Size: 1.6 x 1.1 cm T2: Hypointense ADC: Mildly hypointense DWI: Isointense DCE: Positive Prostate margin: Abuts the prostatic margin and extraprostatic extension is not excluded Overall PI-RADS category: 4 /5 2.  Lesion #: 2 Location: Right posterior transitional zone at the base of the prostate (series 4, image 10) Size: 1.4 x 1.4 cm T2: Hypointense ADC: Hypointense DWI: Hyperintense DCE: Positive Prostate margin: Intact Overall PI-RADS category: 4 /5 3. Lesion #: 3 Location: Right posterior transitional zone at the apex the prostate (series 4, image 15) Size: 1.3 x 0.8 cm T2: Hypointense ADC: Hypointense DWI: Isointense DCE: Positive Prostate margin: Intact Overall PI-RADS category: 3 /5 SEMINAL VESICLES: Unremarkable. NEUROVASCULAR BUNDLES: Involvement of the right neurovascular bundle cannot be excluded. URINARY BLADDER/RECTUM/PELVIC DIAPHRAGM: Circumferential bladder wall thickening as can be seen with chronic urinary retention. The rectum and urogenital diaphragm are unremarkable. PATHOLOGICALLY ENLARGED LYMPH NODES: 1. None. OSSEOUS STRUCTURES: 1. No suspicious osseous lesion. OTHER: 1. Colonic diverticulosis. 1. A 1.6 x 1.1 cm focal lesion in the right posterior peripheral zone at the apex of prostate is a PI-RADS 4 lesion. 2. A 1.4 x 1.4 cm focal lesion in the right posterior transitional zone at the base of the prostate is a PI-RADS 4 lesion 3. A 1.3 x 0.8 cm focal lesion in the right posterior transitional zone at the apex of the prostate is a PI-RADS 3 lesion. 4. Overall PI-RADS assessment is PI-RADS 4 5. Moderate to marked prostatomegaly. 6. Circumferential bladder wall thickening as can be seen with chronic urinary retention. 7. Right neurovascular bundle involvement cannot be excluded. 8. Enlarged and nodular transitional zone as can be seen with benign prostatic hypertrophy.   PI-RADS v 2.1 assessment categories PI-RADS 1: Very low (clinically significant cancer is highly unlikely to be present) PI-RADS 2: Low (clinically significant cancer is unlikely to be present) PI-RADS 3: Intermediate (the presence of clinically significant cancer is equivocal) PI-RADS 4: High (clinically significant cancer is likely to be present) PI-RADS-5: Very high (clinically significant cancer

## 2022-08-12 RX ORDER — CIPROFLOXACIN 500 MG/1
TABLET, FILM COATED ORAL
Qty: 6 TABLET | Refills: 0 | Status: SHIPPED | OUTPATIENT
Start: 2022-08-12 | End: 2022-08-30 | Stop reason: ALTCHOICE

## 2022-08-23 ENCOUNTER — PROCEDURE VISIT (OUTPATIENT)
Dept: UROLOGY | Age: 67
End: 2022-08-23
Payer: MEDICARE

## 2022-08-23 VITALS — WEIGHT: 180 LBS | BODY MASS INDEX: 26.66 KG/M2 | HEIGHT: 69 IN | RESPIRATION RATE: 16 BRPM

## 2022-08-23 DIAGNOSIS — N40.1 BENIGN LOCALIZED PROSTATIC HYPERPLASIA WITH LOWER URINARY TRACT SYMPTOMS (LUTS): ICD-10-CM

## 2022-08-23 DIAGNOSIS — R97.20 ELEVATED PSA: Primary | ICD-10-CM

## 2022-08-23 PROCEDURE — 76872 US TRANSRECTAL: CPT | Performed by: UROLOGY

## 2022-08-23 PROCEDURE — 55700 PR BIOPSY OF PROSTATE,NEEDLE/PUNCH: CPT | Performed by: UROLOGY

## 2022-08-23 RX ORDER — TOBRAMYCIN SULFATE 40 MG/ML
80 INJECTION, SOLUTION INTRAMUSCULAR; INTRAVENOUS ONCE
Status: COMPLETED | OUTPATIENT
Start: 2022-08-23 | End: 2022-08-23

## 2022-08-23 RX ADMIN — TOBRAMYCIN SULFATE 80 MG: 40 INJECTION, SOLUTION INTRAMUSCULAR; INTRAVENOUS at 13:18

## 2022-08-23 NOTE — PROGRESS NOTES
Mr. Finn Sanchez was seen in follow up for his prostate biopsy. The biopsy is being done with MRI / Ultrasound fusion using the UroNav system. The biopsy was done without difficulty or complication. TRUS prostate biopsy note:  After obtaining informed consent, the rectal ultrasound probe was passed per rectum and the prostate visualized. A local block was performed by instilling 2% lidocaine at the base. Measurements were taken for a total volume of 76 cc. At this point, prostate biopsy was performed. Using Gianna Wells, the ultrasound and MRI images were fused and then biopsies of the lesions identified by the radiologist were taken. Three cores were taken from each of the 3 lesions seen on MRI. The rectal probe was removed and there was minimal bleeding. Mr. Finn Sanchez tolerated the procedure well and there were no complications. Prostate size: 76 cc  Cores taken:  6  in addition to    3 cores each from 3 lesions    15 total cores  Complications: none      Assessment:      Prostate biopsy performed without difficulty. This was done with UroNav MRI fused guidance. Plan:        I will see Rogers Garcia back to discuss the pathology in 1-2 weeks. Further recommendations will be based on these results.

## 2022-08-23 NOTE — PROGRESS NOTES
Procedure: Trus/Biopsy-uronav   Pt name and birth date verified Yes  Patient agrees Dr. Radha Salgado is taking biopsies of the prostate Yes  Patient took Enema 2 hours prior to procedure Yes  Patient took 2 pill(s) of cipro day before procedure, day of, and will the day after Yes  Has patient eaten today? Yes  Patient stopped all blood thinners prior to surgery Yes    Patient has given me verbal consent to perform Tobramycin Injection  Yes    Following Dr. Ariadne Mccartney of care. Tobramycin 80MG/2ML GIVEN I.M Rt UOQ HIP  Lot Number: [id number]  Expiration Date: 05/2023  [facility] #: [id number]    Tobramycin supplied by office.

## 2022-08-30 ENCOUNTER — OFFICE VISIT (OUTPATIENT)
Dept: UROLOGY | Age: 67
End: 2022-08-30
Payer: MEDICARE

## 2022-08-30 VITALS
HEIGHT: 69 IN | SYSTOLIC BLOOD PRESSURE: 122 MMHG | WEIGHT: 180 LBS | DIASTOLIC BLOOD PRESSURE: 70 MMHG | BODY MASS INDEX: 26.66 KG/M2

## 2022-08-30 DIAGNOSIS — N40.1 BENIGN LOCALIZED PROSTATIC HYPERPLASIA WITH LOWER URINARY TRACT SYMPTOMS (LUTS): Primary | ICD-10-CM

## 2022-08-30 DIAGNOSIS — R97.20 ELEVATED PSA: ICD-10-CM

## 2022-08-30 PROCEDURE — 1036F TOBACCO NON-USER: CPT | Performed by: UROLOGY

## 2022-08-30 PROCEDURE — G8417 CALC BMI ABV UP PARAM F/U: HCPCS | Performed by: UROLOGY

## 2022-08-30 PROCEDURE — 3017F COLORECTAL CA SCREEN DOC REV: CPT | Performed by: UROLOGY

## 2022-08-30 PROCEDURE — G8427 DOCREV CUR MEDS BY ELIG CLIN: HCPCS | Performed by: UROLOGY

## 2022-08-30 PROCEDURE — 99214 OFFICE O/P EST MOD 30 MIN: CPT | Performed by: UROLOGY

## 2022-08-30 PROCEDURE — 1123F ACP DISCUSS/DSCN MKR DOCD: CPT | Performed by: UROLOGY

## 2022-08-30 RX ORDER — TAMSULOSIN HYDROCHLORIDE 0.4 MG/1
0.4 CAPSULE ORAL DAILY
Qty: 90 CAPSULE | Refills: 3 | Status: SHIPPED | OUTPATIENT
Start: 2022-08-30 | End: 2022-11-28

## 2022-08-30 NOTE — PROGRESS NOTES
[facility] [id number]  Dept: [phone]  Dept Fax: [phone]: [facility] Office Note -     Patient:  Avelina Correa  YOB: 1955    The patient is a 78 y.o. male who presents today for evaluation of the following problems:   Chief Complaint   Patient presents with    Results     Prostate bx         History of Present Illness:    Elevated PSA  Hx of biopsy with bridget in 2020  Here with new biopsy results negative      BPH  Weak stream at times  Has retention issues  80 gram prostate  Not on flomax        Summary of Previous Records:  50-year-old white male returns today for a follow-up. His PSA was rising in June, 2020. He underwent ultrasound-guided prostate biopsies. Fortunately, there was no malignancy found. He has not had a PSA since 4/29/2020 when it was found to be 4.17 NG/mL. He has varying symptoms of NF and stream variation. Exam: wnwd male alert and oriented x 3. KIARA: normal sphincter tone. Prostate is smooth, symmetric, no nodules. UA: wnl  He will get psa with PCP. If normal, see prn. In excess of 15 minutes was spent with the patient discussing their medical history, treatment outcomes and possible treatment related side effects. Greater than 50 per cent of this time was spent in face to face discussion of current disease status and ongoing management. Requested/reviewed records from Ana Lopez MD office and/or outside physician/EMR    (Patient's old records have been requested, reviewed and pertinent findings summarized in today's note.)    Procedures Today: N/A    Last several PSA's:  Lab Results   Component Value Date    PSA 4.17 (H) 04/29/2020       Last total testosterone:  No results found for: TESTOSTERONE    Urinalysis today:  No results found for this visit on 08/30/22.     Last BUN and creatinine:  Lab Results   Component Value Date    BUN 14 02/15/2022     Lab Results   Component Value Date    CREATININE 1.10 02/15/2022       Imaging Reviewed during this Office Visit:   imaging independently reviewed by Quita Santo MD and radiology report verified demonstrating     MRI PROSTATE W WO CONTRAST    Result Date: 7/26/2022  PROCEDURE: MRI PROSTATE W WO CONTRAST CLINICAL INFORMATION: Elevated PSA COMPARISON: None TECHNIQUE: Axial T2, coronal T2 and sagittal T2 imaging of the prostate gland. Large field of view axial T2 imaging of the prostate gland. Axial T1, axial T1 VIBE dynamic post stef and axial T1 VIBE dynamic subtracted post stef images through the prostate gland. Diffusion 50, 800, 1400 and ADC maps through the prostate gland. Axial T1 STAR VIBE post stef through the pelvis. 3-D images reconstructed on a separate Taxi 24/7 Cad workstation with MRI TRUS fusion of prostate mass lesions. CONTRAST: 15 mL of ProHance  intravenously. LABORATORY: 1. PSA on 6/2/2022 was 5.5 ng/ml 2. PSA on 2/15/2022 was 5.89 ng/ml 3. PSA on 2/15/2021 was 3.91 ng/ml FINDINGS: PROSTATE SIZE: (As measured on Taxi 24/7 cad workstation) 1. The prostate gland is moderately to markedly enlarged measuring 5.3 x 4.9 x 6 cm. 2. The prostate volume is 75.86 ml. TRANSITIONAL ZONE: 1. Enlarged and nodular transitional zone as can be seen with benign prostatic hypertrophy. PERIPHERAL ZONE: 1. Areas of intermediate T2 signal along with areas of T2 hyperintensity noted. PROSTATE LESIONS: 1. Lesion #: 1 Location: Right posterior peripheral zone at the apex of the prostate (series 4, image 16) Size: 1.6 x 1.1 cm T2: Hypointense ADC: Mildly hypointense DWI: Isointense DCE: Positive Prostate margin: Abuts the prostatic margin and extraprostatic extension is not excluded Overall PI-RADS category: 4 /5 2.  Lesion #: 2 Location: Right posterior transitional zone at the base of the prostate (series 4, image 10) Size: 1.4 x 1.4 cm T2: Hypointense ADC: Hypointense DWI: Hyperintense DCE: Positive Prostate margin: Intact Overall PI-RADS category: 4 /5 3. Lesion #: 3 Location: Right posterior transitional zone at the apex the prostate (series 4, image 15) Size: 1.3 x 0.8 cm T2: Hypointense ADC: Hypointense DWI: Isointense DCE: Positive Prostate margin: Intact Overall PI-RADS category: 3 /5 SEMINAL VESICLES: Unremarkable. NEUROVASCULAR BUNDLES: Involvement of the right neurovascular bundle cannot be excluded. URINARY BLADDER/RECTUM/PELVIC DIAPHRAGM: Circumferential bladder wall thickening as can be seen with chronic urinary retention. The rectum and urogenital diaphragm are unremarkable. PATHOLOGICALLY ENLARGED LYMPH NODES: 1. None. OSSEOUS STRUCTURES: 1. No suspicious osseous lesion. OTHER: 1. Colonic diverticulosis. 1. A 1.6 x 1.1 cm focal lesion in the right posterior peripheral zone at the apex of prostate is a PI-RADS 4 lesion. 2. A 1.4 x 1.4 cm focal lesion in the right posterior transitional zone at the base of the prostate is a PI-RADS 4 lesion 3. A 1.3 x 0.8 cm focal lesion in the right posterior transitional zone at the apex of the prostate is a PI-RADS 3 lesion. 4. Overall PI-RADS assessment is PI-RADS 4 5. Moderate to marked prostatomegaly. 6. Circumferential bladder wall thickening as can be seen with chronic urinary retention. 7. Right neurovascular bundle involvement cannot be excluded. 8. Enlarged and nodular transitional zone as can be seen with benign prostatic hypertrophy.   PI-RADS v 2.1 assessment categories PI-RADS 1: Very low (clinically significant cancer is highly unlikely to be present) PI-RADS 2: Low (clinically significant cancer is unlikely to be present) PI-RADS 3: Intermediate (the presence of clinically significant cancer is equivocal) PI-RADS 4: High (clinically significant cancer is likely to be present) PI-RADS-5: Very high (clinically significant cancer is highly likely to be present) **This report has been created using voice recognition Sexual Activity   Alcohol Use Not Currently    Comment: Moderate on weekends but none the last month       REVIEW OF SYSTEMS:  Constitutional: negative  Eyes: negative  Respiratory: negative  Cardiovascular: negative  Gastrointestinal: negative  Genitourinary: see HPI  Musculoskeletal: negative  Skin: negative   Neurological: negative  Hematological/Lymphatic: negative  Psychological: negative      Physical Exam:    This a 78 y.o. male  Vitals:    08/30/22 1455   BP: 122/70     Body mass index is 26.58 kg/m². Constitutional: Patient in no acute distress;         Assessment and Plan        1. Benign localized prostatic hyperplasia with lower urinary tract symptoms (LUTS)               Plan:      Elevated psa- two biopsies negative (2020 and now 2022). Psa monitoring in six months. BPH- stable symptoms but weak stream biggest complaint. 80 g prostate. Offered flomax, he will consider this. PSA in six months        Prescriptions Ordered:  No orders of the defined types were placed in this encounter. Orders Placed:  No orders of the defined types were placed in this encounter.            Gabe Funk MD

## 2023-02-22 LAB — PROSTATE SPECIFIC ANTIGEN: 4.32 NG/ML

## 2023-02-22 SDOH — HEALTH STABILITY: PHYSICAL HEALTH: ON AVERAGE, HOW MANY MINUTES DO YOU ENGAGE IN EXERCISE AT THIS LEVEL?: 60 MIN

## 2023-02-22 SDOH — HEALTH STABILITY: PHYSICAL HEALTH: ON AVERAGE, HOW MANY DAYS PER WEEK DO YOU ENGAGE IN MODERATE TO STRENUOUS EXERCISE (LIKE A BRISK WALK)?: 4 DAYS

## 2023-02-22 ASSESSMENT — PATIENT HEALTH QUESTIONNAIRE - PHQ9
SUM OF ALL RESPONSES TO PHQ QUESTIONS 1-9: 0
SUM OF ALL RESPONSES TO PHQ9 QUESTIONS 1 & 2: 0
2. FEELING DOWN, DEPRESSED OR HOPELESS: 0
1. LITTLE INTEREST OR PLEASURE IN DOING THINGS: 0

## 2023-02-22 ASSESSMENT — LIFESTYLE VARIABLES
HOW OFTEN DO YOU HAVE A DRINK CONTAINING ALCOHOL: 2-4 TIMES A MONTH
HOW OFTEN DO YOU HAVE A DRINK CONTAINING ALCOHOL: 3
HOW OFTEN DO YOU HAVE SIX OR MORE DRINKS ON ONE OCCASION: 1
HOW MANY STANDARD DRINKS CONTAINING ALCOHOL DO YOU HAVE ON A TYPICAL DAY: 1
HOW MANY STANDARD DRINKS CONTAINING ALCOHOL DO YOU HAVE ON A TYPICAL DAY: 1 OR 2

## 2023-02-24 SDOH — ECONOMIC STABILITY: FOOD INSECURITY: WITHIN THE PAST 12 MONTHS, THE FOOD YOU BOUGHT JUST DIDN'T LAST AND YOU DIDN'T HAVE MONEY TO GET MORE.: NEVER TRUE

## 2023-02-24 SDOH — ECONOMIC STABILITY: HOUSING INSECURITY
IN THE LAST 12 MONTHS, WAS THERE A TIME WHEN YOU DID NOT HAVE A STEADY PLACE TO SLEEP OR SLEPT IN A SHELTER (INCLUDING NOW)?: NO

## 2023-02-24 SDOH — ECONOMIC STABILITY: FOOD INSECURITY: WITHIN THE PAST 12 MONTHS, YOU WORRIED THAT YOUR FOOD WOULD RUN OUT BEFORE YOU GOT MONEY TO BUY MORE.: NEVER TRUE

## 2023-02-24 SDOH — ECONOMIC STABILITY: TRANSPORTATION INSECURITY
IN THE PAST 12 MONTHS, HAS LACK OF TRANSPORTATION KEPT YOU FROM MEETINGS, WORK, OR FROM GETTING THINGS NEEDED FOR DAILY LIVING?: NO

## 2023-02-24 SDOH — ECONOMIC STABILITY: INCOME INSECURITY: HOW HARD IS IT FOR YOU TO PAY FOR THE VERY BASICS LIKE FOOD, HOUSING, MEDICAL CARE, AND HEATING?: NOT HARD AT ALL

## 2023-02-27 ENCOUNTER — OFFICE VISIT (OUTPATIENT)
Dept: FAMILY MEDICINE CLINIC | Age: 68
End: 2023-02-27

## 2023-02-27 VITALS
SYSTOLIC BLOOD PRESSURE: 118 MMHG | RESPIRATION RATE: 16 BRPM | HEIGHT: 69 IN | HEART RATE: 64 BPM | BODY MASS INDEX: 26.59 KG/M2 | DIASTOLIC BLOOD PRESSURE: 74 MMHG | WEIGHT: 179.5 LBS

## 2023-02-27 DIAGNOSIS — R53.83 OTHER FATIGUE: ICD-10-CM

## 2023-02-27 DIAGNOSIS — E78.5 HYPERLIPIDEMIA, UNSPECIFIED HYPERLIPIDEMIA TYPE: ICD-10-CM

## 2023-02-27 DIAGNOSIS — K21.9 GASTROESOPHAGEAL REFLUX DISEASE WITHOUT ESOPHAGITIS: ICD-10-CM

## 2023-02-27 DIAGNOSIS — N40.0 HYPERPLASIA OF PROSTATE: ICD-10-CM

## 2023-02-27 DIAGNOSIS — Z00.00 WELCOME TO MEDICARE PREVENTIVE VISIT: Primary | ICD-10-CM

## 2023-02-27 DIAGNOSIS — M25.561 CHRONIC PAIN OF RIGHT KNEE: ICD-10-CM

## 2023-02-27 DIAGNOSIS — G89.29 CHRONIC PAIN OF RIGHT KNEE: ICD-10-CM

## 2023-02-27 PROCEDURE — G8427 DOCREV CUR MEDS BY ELIG CLIN: HCPCS | Performed by: FAMILY MEDICINE

## 2023-02-27 PROCEDURE — G8417 CALC BMI ABV UP PARAM F/U: HCPCS | Performed by: FAMILY MEDICINE

## 2023-02-27 PROCEDURE — 1123F ACP DISCUSS/DSCN MKR DOCD: CPT | Performed by: FAMILY MEDICINE

## 2023-02-27 PROCEDURE — 3017F COLORECTAL CA SCREEN DOC REV: CPT | Performed by: FAMILY MEDICINE

## 2023-02-27 PROCEDURE — G0402 INITIAL PREVENTIVE EXAM: HCPCS | Performed by: FAMILY MEDICINE

## 2023-02-27 PROCEDURE — 99213 OFFICE O/P EST LOW 20 MIN: CPT | Performed by: FAMILY MEDICINE

## 2023-02-27 PROCEDURE — 1036F TOBACCO NON-USER: CPT | Performed by: FAMILY MEDICINE

## 2023-02-27 RX ORDER — OMEPRAZOLE 20 MG/1
20 CAPSULE, DELAYED RELEASE ORAL DAILY
COMMUNITY

## 2023-02-27 ASSESSMENT — ENCOUNTER SYMPTOMS
EYE PAIN: 0
CHEST TIGHTNESS: 0
ABDOMINAL PAIN: 0
SORE THROAT: 0
BACK PAIN: 0
TROUBLE SWALLOWING: 0
NAUSEA: 0
COUGH: 0
BLOOD IN STOOL: 0
CONSTIPATION: 0
SHORTNESS OF BREATH: 0

## 2023-02-27 NOTE — PROGRESS NOTES
Medicare Annual Wellness Visit    Rey Enriquez is here for Medicare AWV    Assessment & Plan       ICD-10-CM    1. Welcome to Medicare preventive visit  Z00.00       2. Hyperplasia of prostate  N40.0       3. Chronic pain of right knee  M25.561 FREDRICK Pham MD, Orthopedic Surgery, [facility] [place] OFFICE OUTPATIENT VISIT 15 MINUTES [[id number]]      4. Gastroesophageal reflux disease without esophagitis  K21.9 Comprehensive Metabolic Panel     CBC with Auto Differential     [place] OFFICE OUTPATIENT VISIT 15 MINUTES [[id number]]      5. Hyperlipidemia, unspecified hyperlipidemia type  E78.5 Lipid Panel     [place] OFFICE OUTPATIENT VISIT 15 MINUTES [[id number]]      6. Other fatigue  R53.83 TSH with Reflex             PLAN      Current Outpatient Medications   Medication Sig Dispense Refill    omeprazole (PRILOSEC) 20 MG delayed release capsule Take 20 mg by mouth daily      tamsulosin (FLOMAX) 0.4 MG capsule Take 1 capsule by mouth daily 90 capsule 3    turmeric 500 MG CAPS Take by mouth daily      vitamin D 25 MCG (1000 UT) CAPS Take by mouth daily      Methylsulfonylmethane 1000 MG CAPS Take by mouth daily      Coenzyme Q10 (CO Q 10) 100 MG CAPS Take 1 capsule by mouth daily      multivitamin (THERAGRAN) per tablet Take 1 tablet by mouth daily. vitamin B-12 (CYANOCOBALAMIN) 1000 MCG tablet Take 1,000 mcg by mouth daily. No current facility-administered medications for this visit.       Orders Placed This Encounter   Procedures    TSH with Reflex     Standing Status:   Future     Standing Expiration Date:   2/27/2024    Lipid Panel     Standing Status:   Future     Standing Expiration Date:   2/27/2024     Order Specific Question:   Is Patient Fasting?/# of Hours     Answer:   YES 12 HOURS    Comprehensive Metabolic Panel     Standing Status:   Future     Standing Expiration Date:   2/27/2024    CBC with Auto Differential     Standing Status:   Future     Standing Expiration Date:   2/27/2024    FREDRICK Timmons Laurie Perez MD, Orthopedic Surgery, [facility]     Referral Priority:   Routine     Referral Type:   Eval and Treat     Referral Reason:   Specialty Services Required     Referred to Provider:   Alfredo Haider MD     Requested Specialty:   Orthopedic Surgery     Number of Visits Requested:   1    [place] OFFICE OUTPATIENT VISIT 15 MINUTES [[id number]]             See in    year        Colonoscopy  this  year      Get  lab       Orhtho    for  knee      See orhto    No follow-ups on file. Subjective       Patient's complete Health Risk Assessment and screening values have been reviewed and are found in Flowsheets. The following problems were reviewed today and where indicated follow up appointments were made and/or referrals ordered. Positive Risk Factor Screenings with Interventions:                    Vision Screen:  Do you have difficulty driving, watching TV, or doing any of your daily activities because of your eyesight?: No  Have you had an eye exam within the past year?: (!) No  No results found. Interventions: This  year     Safety:  Do you have any tripping hazards - loose or unsecured carpets or rugs?: (!) Yes    Interventions:     Stable              Colonoscopy   due  2018 as due  2023         Egd and  food  lodged and  on  20  mg  prilosec           Objective   Vitals:    02/27/23 0914   BP: 118/74   Site: Right Upper Arm   Position: Sitting   Cuff Size: Medium Adult   Pulse: 64   Resp: 16   Weight: 179 lb 8 oz (81.4 kg)   Height: 5' 9\" (1.753 m)      Body mass index is 26.51 kg/m². No Known Allergies  Prior to Visit Medications    Medication Sig Taking?  Authorizing Provider   omeprazole (PRILOSEC) 20 MG delayed release capsule Take 20 mg by mouth daily Yes Historical Provider, MD   tamsulosin (FLOMAX) 0.4 MG capsule Take 1 capsule by mouth daily Yes Jurgen Andrews MD   turmeric 500 MG CAPS Take by mouth daily Yes Historical Provider, MD   vitamin D 25 MCG (1000 UT) CAPS Take by mouth daily Yes Historical Provider, MD   Methylsulfonylmethane 1000 MG CAPS Take by mouth daily Yes Historical Provider, MD   Coenzyme Q10 (CO Q 10) 100 MG CAPS Take 1 capsule by mouth daily Yes Historical Provider, MD   multivitamin [facility]) per tablet Take 1 tablet by mouth daily. Yes Historical Provider, MD   vitamin B-12 (CYANOCOBALAMIN) 1000 MCG tablet Take 1,000 mcg by mouth daily. Yes Historical Provider, MD Barros (Including outside providers/suppliers regularly involved in providing care):   Patient Care Team:  Alissa Mcneil MD as PCP - General (Family Medicine)  Alissa Mcneil MD as PCP - Empaneled Provider     Reviewed and updated this visit:  Tobacco  Allergies  Meds  Med Hx  Surg Hx  Soc Hx  Fam Hx             Nadeau Tracey (:  1955) is a 80 y.o. male,Established patient, here for evaluation of the following chief complaint(s):  Medicare AWV         ASSESSMENT/         ICD-10-CM    1. Chronic pain of right knee  M25.561     G89.29       2. Hyperplasia of prostate  N40.0       3. Gastroesophageal reflux disease without esophagitis  K21.9 Comprehensive Metabolic Panel     CBC with Auto Differential      4. Hyperlipidemia, unspecified hyperlipidemia type  E78.5 Lipid Panel      5. Other fatigue  R53.83 TSH with Reflex           PLAN:        Current Outpatient Medications   Medication Sig Dispense Refill    omeprazole (PRILOSEC) 20 MG delayed release capsule Take 20 mg by mouth daily      tamsulosin (FLOMAX) 0.4 MG capsule Take 1 capsule by mouth daily 90 capsule 3    turmeric 500 MG CAPS Take by mouth daily      vitamin D 25 MCG (1000 UT) CAPS Take by mouth daily      Methylsulfonylmethane 1000 MG CAPS Take by mouth daily      Coenzyme Q10 (CO Q 10) 100 MG CAPS Take 1 capsule by mouth daily      multivitamin (THERAGRAN) per tablet Take 1 tablet by mouth daily. vitamin B-12 (CYANOCOBALAMIN) 1000 MCG tablet Take 1,000 mcg by mouth daily.        No current facility-administered medications for this visit. Orders Placed This Encounter   Procedures    TSH with Reflex     Standing Status:   Future     Standing Expiration Date:   2/27/2024    Lipid Panel     Standing Status:   Future     Standing Expiration Date:   2/27/2024     Order Specific Question:   Is Patient Fasting?/# of Hours     Answer:   YES 12 HOURS    Comprehensive Metabolic Panel     Standing Status:   Future     Standing Expiration Date:   2/27/2024    CBC with Auto Differential     Standing Status:   Future     Standing Expiration Date:   2/27/2024    FREDRICK - Royetta Phalen, MD, Orthopedic Surgery, [facility]     Referral Priority:   Routine     Referral Type:   Eval and Treat     Referral Reason:   Specialty Services Required     Referred to Provider:   Guevara Allison MD     Requested Specialty:   Orthopedic Surgery     Number of Visits Requested:   1        Subjective   SUBJECTIVE/OBJECTIVE:  Some  fatigue  at  times         Right  knee  pain    noted  and  with pain   and  knee  with  scoped       Bph   stable  and  biopsy  negative   flomax  better       Gerd  stable  stable     Review of Systems   Constitutional:  Negative for fatigue and fever. HENT:  Negative for congestion, ear pain, postnasal drip, sore throat and trouble swallowing. Eyes:  Negative for pain. Respiratory:  Negative for cough, chest tightness and shortness of breath. Cardiovascular:  Negative for chest pain, palpitations and leg swelling. Gastrointestinal:  Negative for abdominal pain, blood in stool, constipation and nausea. Genitourinary:  Negative for difficulty urinating, frequency and urgency. Musculoskeletal:  Negative for arthralgias, back pain, joint swelling and neck stiffness. Skin:  Negative for rash. Neurological:  Negative for dizziness, weakness and headaches. Hematological:  Negative for adenopathy. Does not bruise/bleed easily.    Psychiatric/Behavioral:  Negative for behavioral problems, dysphoric mood and sleep disturbance. Past Medical History:   Diagnosis Date    BPH (benign prostatic hyperplasia)     Esophagitis, acute 12/2020    [facility]  egd    GERD (gastroesophageal reflux disease)     Headache(784.0)     Schatzki's ring     Varicose veins      Past Surgical History:   Procedure Laterality Date    COLONOSCOPY  2007 and  2015 2018    fam  hx  of  sibling    nanci     KNEE ARTHROSCOPY Left 1998    PROSTATE BIOPSY  08/2022    (-)    PROSTATE BIOPSY  07/2022    Dr Kenney Agustin @ [facility]    ULTRASOUND PROSTATE/TRANSRECTAL N/A 06/22/2020    TRANSRECTAL ULTRASOUND WITH PROSTATE  BIOPSY performed by Dexter Pack MD at [address] 11/2020    done on  and   [facility]     Social History     Socioeconomic History    Marital status:      Spouse name: Not on file    Number of children: Not on file    Years of education: Not on file    Highest education level: Not on file   Occupational History    Not on file   Tobacco Use    Smoking status: Never    Smokeless tobacco: Never   Substance and Sexual Activity    Alcohol use: Not Currently     Comment: Moderate on weekends but none the last month    Drug use: No    Sexual activity: Yes     Partners: Female   Other Topics Concern    Not on file   Social History Narrative    Not on file     Social Determinants of Health     Financial Resource Strain: Low Risk     Difficulty of Paying Living Expenses: Not hard at all   Food Insecurity: No Food Insecurity    Worried About [address] in the Last Year: Never true    [address] [facility] [address] in the Last Year: Never true   Transportation Needs: Unknown    Lack of Transportation (Medical): Not on file    Lack of Transportation (Non-Medical):  No   Physical Activity: Sufficiently Active    Days of Exercise per Week: 4 days    Minutes of Exercise per Session: 60 min   Stress: Not on file   Social Connections: Not on file   Intimate Partner Violence: Not on file   Housing Stability: Unknown Unable to Pay for Housing in the Last Year: Not on file    Number of Places Lived in the Last Year: Not on file    Unstable Housing in the Last Year: No     Family History   Problem Relation Age of Onset    Cancer Sister         colon  cancer    Cancer Brother         colon cancer    Heart Disease Mother     Stroke Mother     Diabetes Father         /74 (Site: Right Upper Arm, Position: Sitting, Cuff Size: Medium Adult)   Pulse 64   Resp 16   Ht 5' 9\" (1.753 m)   Wt 179 lb 8 oz (81.4 kg)   BMI 26.51 kg/m²    Objective   Physical Exam  Vitals and nursing note reviewed. Constitutional:       Appearance: He is well-developed. HENT:      Head: Normocephalic and atraumatic. Right Ear: External ear normal.      Left Ear: External ear normal.      Nose: Nose normal.   Eyes:      Conjunctiva/sclera: Conjunctivae normal.      Pupils: Pupils are equal, round, and reactive to light. Comments: Fundi nl   Neck:      Thyroid: No thyromegaly. Cardiovascular:      Rate and Rhythm: Normal rate and regular rhythm. Heart sounds: Normal heart sounds. Pulmonary:      Effort: Pulmonary effort is normal.      Breath sounds: Normal breath sounds. No wheezing or rales. Abdominal:      General: Bowel sounds are normal.      Palpations: Abdomen is soft. There is no mass. Tenderness: There is no abdominal tenderness. Musculoskeletal:         General: Normal range of motion. Cervical back: Normal range of motion and neck supple. Comments:   Right  knee  with  crepitus   lateral  peñaloza  sign   Lymphadenopathy:      Cervical: No cervical adenopathy. Skin:     General: Skin is warm and dry. Findings: No rash. Neurological:      Mental Status: He is alert and oriented to person, place, and time. Cranial Nerves: No cranial nerve deficit. Deep Tendon Reflexes: Reflexes are normal and symmetric.                 An electronic signature was used to authenticate this note.    --MD Neto Arreola MD

## 2023-02-27 NOTE — PATIENT INSTRUCTIONS
Learning About Vision Tests  What are vision tests? The four most common vision tests are visual acuity tests, refraction, visual field tests, and color vision tests. Visual acuity (sharpness) tests  These tests are used: To see if you need glasses or contact lenses. To monitor an eye problem. To check an eye injury. Visual acuity tests are done as part of routine exams. You may also have this test when you get your 's license or apply for some types of jobs. Visual field tests  These tests are used: To check for vision loss in any area of your range of vision. To screen for certain eye diseases. To look for nerve damage after a stroke, head injury, or other problem that could reduce blood flow to the brain. Refraction and color tests  A refraction test is done to find the right prescription for glasses and contact lenses. A color vision test is done to check for color blindness. Color vision is often tested as part of a routine exam. You may also have this test when you apply for a job where recognizing different colors is important, such as , electronics, or the [employer]. How are vision tests done? Visual acuity test   You cover one eye at a time. You read aloud from a wall chart across the room. You read aloud from a small card that you hold in your hand. Refraction   You look into a special device. The device puts lenses of different strengths in front of each eye to see how strong your glasses or contact lenses need to be. Visual field tests   Your doctor may have you look through special machines. Or your doctor may simply have you stare straight ahead while they move a finger into and out of your field of vision. Color vision test   You look at pieces of printed test patterns in various colors. You say what number or symbol you see. Your doctor may have you trace the number or symbol using a pointer. How do these tests feel?   There is very little chance of having a problem from this test. If dilating drops are used for a vision test, they may make the eyes sting and cause a medicine taste in the mouth. Follow-up care is a key part of your treatment and safety. Be sure to make and go to all appointments, and call your doctor if you are having problems. It's also a good idea to know your test results and keep a list of the medicines you take. Where can you learn more? Go to http://www.monroe.com/ and enter G551 to learn more about \"Learning About Vision Tests. \"  Current as of: October 12, 2022               Content Version: 13.5  © 5492-2741 [employer]. Care instructions adapted under license by [facility] ([facility]). If you have questions about a medical condition or this instruction, always ask your healthcare professional. [address] any warranty or liability for your use of this information. Advance Directives: Care Instructions  Overview  An advance directive is a legal way to state your wishes at the end of your life. It tells your family and your doctor what to do if you can't say what you want. There are two main types of advance directives. You can change them any time your wishes change. Living will. This form tells your family and your doctor your wishes about life support and other treatment. The form is also called a declaration. Medical power of . This form lets you name a person to make treatment decisions for you when you can't speak for yourself. This person is called a health care agent (health care proxy, health care surrogate). The form is also called a durable power of  for health care. If you do not have an advance directive, decisions about your medical care may be made by a family member, or by a doctor or a  who doesn't know you. It may help to think of an advance directive as a gift to the people who care for you.  If you have one, they won't have to make tough decisions by themselves. For more information, including forms for your state, see the [address] website (www.caringinfo.org/planning/advance-directives/). Follow-up care is a key part of your treatment and safety. Be sure to make and go to all appointments, and call your doctor if you are having problems. It's also a good idea to know your test results and keep a list of the medicines you take. What should you include in an advance directive? Many states have a unique advance directive form. (It may ask you to address specific issues.) Or you might use a universal form that's approved by many states. If your form doesn't tell you what to address, it may be hard to know what to include in your advance directive. Use the questions below to help you get started. Who do you want to make decisions about your medical care if you are not able to? What life-support measures do you want if you have a serious illness that gets worse over time or can't be cured? What are you most afraid of that might happen? (Maybe you're afraid of having pain, losing your independence, or being kept alive by machines.)  Where would you prefer to die? (Your home? A hospital? A nursing home?)  Do you want to donate your organs when you die? Do you want certain Cheondoism practices performed before you die? When should you call for help? Be sure to contact your doctor if you have any questions. Where can you learn more? Go to http://www.monroe.com/ and enter R264 to learn more about \"Advance Directives: Care Instructions. \"  Current as of: June 16, 2022               Content Version: 13.5  © 7075-6722 [employer]. Care instructions adapted under license by [facility] ([facility]). If you have questions about a medical condition or this instruction, always ask your healthcare professional. [address] any warranty or liability for your use of this information.            A Healthy Heart: Care Instructions  Your Care Instructions     Coronary artery disease, also called heart disease, occurs when a substance called plaque builds up in the vessels that supply oxygen-rich blood to your heart muscle. This can narrow the blood vessels and reduce blood flow. A heart attack happens when blood flow is completely blocked. A high-fat diet, smoking, and other factors increase the risk of heart disease. Your doctor has found that you have a chance of having heart disease. You can do lots of things to keep your heart healthy. It may not be easy, but you can change your diet, exercise more, and quit smoking. These steps really work to lower your chance of heart disease. Follow-up care is a key part of your treatment and safety. Be sure to make and go to all appointments, and call your doctor if you are having problems. It's also a good idea to know your test results and keep a list of the medicines you take. How can you care for yourself at home? Diet    Use less salt when you cook and eat. This helps lower your blood pressure. Taste food before salting. Add only a little salt when you think you need it. With time, your taste buds will adjust to less salt.     Eat fewer snack items, fast foods, canned soups, and other high-salt, high-fat, processed foods.     Read food labels and try to avoid saturated and trans fats. They increase your risk of heart disease by raising cholesterol levels.     Limit the amount of solid fat-butter, margarine, and shortening-you eat. Use olive, peanut, or canola oil when you cook. Bake, broil, and steam foods instead of frying them.     Eat a variety of fruit and vegetables every day. Dark green, deep orange, red, or yellow fruits and vegetables are especially good for you. Examples include spinach, carrots, peaches, and berries.     Foods high in fiber can reduce your cholesterol and provide important vitamins and minerals.  High-fiber foods include whole-grain cereals and breads, oatmeal, beans, brown rice, citrus fruits, and apples.     Eat lean proteins. Heart-healthy proteins include seafood, lean meats and poultry, eggs, beans, peas, nuts, seeds, and soy products.     Limit drinks and foods with added sugar. These include candy, desserts, and soda pop. Lifestyle changes    If your doctor recommends it, get more exercise. Walking is a good choice. Bit by bit, increase the amount you walk every day. Try for at least 30 minutes on most days of the week. You also may want to swim, bike, or do other activities.     Do not smoke. If you need help quitting, talk to your doctor about stop-smoking programs and medicines. These can increase your chances of quitting for good. Quitting smoking may be the most important step you can take to protect your heart. It is never too late to quit.     Limit alcohol to 2 drinks a day for men and 1 drink a day for women. Too much alcohol can cause health problems.     Manage other health problems such as diabetes, high blood pressure, and high cholesterol. If you think you may have a problem with alcohol or drug use, talk to your doctor. Medicines    Take your medicines exactly as prescribed. Call your doctor if you think you are having a problem with your medicine.     If your doctor recommends aspirin, take the amount directed each day. Make sure you take aspirin and not another kind of pain reliever, such as acetaminophen (Tylenol). When should you call for help? Call 911 if you have symptoms of a heart attack. These may include:    Chest pain or pressure, or a strange feeling in the chest.     Sweating.     Shortness of breath.     Pain, pressure, or a strange feeling in the back, neck, jaw, or upper belly or in one or both shoulders or arms.     Lightheadedness or sudden weakness.     A fast or irregular heartbeat. After you call 911, the  may tell you to chew 1 adult-strength or 2 to 4 low-dose aspirin. Wait for an ambulance.  Do not try to drive yourself. Watch closely for changes in your health, and be sure to contact your doctor if you have any problems. Where can you learn more? Go to http://www.monroe.com/ and enter F075 to learn more about \"A Healthy Heart: Care Instructions. \"  Current as of: September 7, 2022               Content Version: 13.5  © 2006-2022 [employer]. Care instructions adapted under license by [facility] ([facility]). If you have questions about a medical condition or this instruction, always ask your healthcare professional. [address] any warranty or liability for your use of this information. Personalized Preventive Plan for Rosendo Mayo - 2/27/2023  Medicare offers a range of preventive health benefits. Some of the tests and screenings are paid in full while other may be subject to a deductible, co-insurance, and/or copay. Some of these benefits include a comprehensive review of your medical history including lifestyle, illnesses that may run in your family, and various assessments and screenings as appropriate. After reviewing your medical record and screening and assessments performed today your provider may have ordered immunizations, labs, imaging, and/or referrals for you. A list of these orders (if applicable) as well as your Preventive Care list are included within your After Visit Summary for your review. Other Preventive Recommendations:    A preventive eye exam performed by an eye specialist is recommended every 1-2 years to screen for glaucoma; cataracts, macular degeneration, and other eye disorders. A preventive dental visit is recommended every 6 months. Try to get at least 150 minutes of exercise per week or 10,000 steps per day on a pedometer . Order or download the FREE \"Exercise & Physical Activity: Your Everyday Guide\" from [employer]. Call 7-780.564.4910 or search [employer] online.   You need 8175-8945 mg of calcium and 2241-9531 IU of vitamin D per day. It is possible to meet your calcium requirement with diet alone, but a vitamin D supplement is usually necessary to meet this goal.  When exposed to the sun, use a sunscreen that protects against both UVA and UVB radiation with an SPF of 30 or greater. Reapply every 2 to 3 hours or after sweating, drying off with a towel, or swimming. Always wear a seat belt when traveling in a car. Always wear a helmet when riding a bicycle or motorcycle.

## 2023-02-28 ENCOUNTER — TELEPHONE (OUTPATIENT)
Dept: FAMILY MEDICINE CLINIC | Age: 68
End: 2023-02-28

## 2023-02-28 ENCOUNTER — OFFICE VISIT (OUTPATIENT)
Dept: UROLOGY | Age: 68
End: 2023-02-28
Payer: MEDICARE

## 2023-02-28 VITALS — HEIGHT: 69 IN | WEIGHT: 179 LBS | RESPIRATION RATE: 15 BRPM | BODY MASS INDEX: 26.51 KG/M2

## 2023-02-28 DIAGNOSIS — N40.1 BENIGN LOCALIZED PROSTATIC HYPERPLASIA WITH LOWER URINARY TRACT SYMPTOMS (LUTS): Primary | ICD-10-CM

## 2023-02-28 DIAGNOSIS — R97.20 ELEVATED PSA: ICD-10-CM

## 2023-02-28 LAB
ABSOLUTE BASO #: 0.05 K/UL (ref 0–0.2)
ABSOLUTE EOS #: 0.09 K/UL (ref 0–0.5)
ABSOLUTE LYMPH #: 2.23 K/UL (ref 1–4)
ABSOLUTE MONO #: 0.48 K/UL (ref 0.2–1)
ABSOLUTE NEUT #: 2.02 K/UL (ref 1.5–7.5)
ALBUMIN SERPL-MCNC: 4.7 G/DL (ref 3.5–5.2)
ALK PHOSPHATASE: 87 U/L (ref 40–125)
ALT SERPL-CCNC: 18 U/L (ref 5–50)
ANION GAP SERPL CALCULATED.3IONS-SCNC: 8 MEQ/L (ref 7–16)
AST SERPL-CCNC: 20 U/L (ref 9–50)
BASOPHILS RELATIVE PERCENT: 1 %
BILIRUB SERPL-MCNC: 1 MG/DL
BILIRUBIN URINE: NEGATIVE
BLOOD URINE, POC: NEGATIVE
BUN BLDV-MCNC: 15 MG/DL (ref 8–23)
CALCIUM SERPL-MCNC: 9.3 MG/DL (ref 8.5–10.5)
CHARACTER, URINE: CLEAR
CHLORIDE BLD-SCNC: 103 MEQ/L (ref 95–107)
CHOLESTEROL/HDL RATIO: 3.8 RATIO
CHOLESTEROL: 188 MG/DL
CO2: 28 MEQ/L (ref 19–31)
COLOR, URINE: ABNORMAL
CREAT SERPL-MCNC: 1.06 MG/DL (ref 0.8–1.4)
EGFR IF NONAFRICAN AMERICAN: 77 ML/MIN/1.73
EOSINOPHILS RELATIVE PERCENT: 1.8 %
GLUCOSE URINE: NEGATIVE MG/DL
GLUCOSE: 98 MG/DL (ref 70–99)
HCT VFR BLD CALC: 46.9 % (ref 40–51)
HDLC SERPL-MCNC: 50 MG/DL
HEMOGLOBIN: 16.1 G/DL (ref 13.5–17)
KETONES, URINE: NEGATIVE
LDL CHOLESTEROL CALCULATED: 127 MG/DL
LDL/HDL RATIO: 2.5 RATIO
LEUKOCYTE CLUMPS, URINE: NEGATIVE
LYMPHOCYTE %: 45.7 %
MCH RBC QN AUTO: 30.1 PG (ref 25–33)
MCHC RBC AUTO-ENTMCNC: 34.3 G/DL (ref 31–36)
MCV RBC AUTO: 87.7 FL (ref 80–99)
MONOCYTES # BLD: 9.8 %
NEUTROPHILS RELATIVE PERCENT: 41.5 %
NITRITE, URINE: NEGATIVE
PDW BLD-RTO: 13 % (ref 11.5–15)
PH, URINE: 5.5 (ref 5–9)
PLATELETS: 296 K/UL (ref 130–400)
PMV BLD AUTO: 9.8 FL (ref 9.3–13)
POST VOID RESIDUAL (PVR): 115 ML
POTASSIUM SERPL-SCNC: 4.9 MEQ/L (ref 3.5–5.4)
PROTEIN, URINE: ABNORMAL MG/DL
RBC: 5.35 M/UL (ref 4.5–6.1)
SODIUM BLD-SCNC: 139 MEQ/L (ref 133–146)
SPECIFIC GRAVITY, URINE: >= 1.03 (ref 1–1.03)
TOTAL PROTEIN: 7.2 G/DL (ref 6.1–8.3)
TRIGL SERPL-MCNC: 57 MG/DL
TSH SERPL DL<=0.05 MIU/L-ACNC: 3.16 UIU/ML (ref 0.4–4.1)
UROBILINOGEN, URINE: 0.2 EU/DL (ref 0–1)
VLDLC SERPL CALC-MCNC: 11 MG/DL
WBC: 4.9 K/UL (ref 3.5–11)

## 2023-02-28 PROCEDURE — 51798 US URINE CAPACITY MEASURE: CPT | Performed by: UROLOGY

## 2023-02-28 PROCEDURE — 81003 URINALYSIS AUTO W/O SCOPE: CPT | Performed by: UROLOGY

## 2023-02-28 PROCEDURE — 1123F ACP DISCUSS/DSCN MKR DOCD: CPT | Performed by: UROLOGY

## 2023-02-28 PROCEDURE — 1036F TOBACCO NON-USER: CPT | Performed by: UROLOGY

## 2023-02-28 PROCEDURE — G8417 CALC BMI ABV UP PARAM F/U: HCPCS | Performed by: UROLOGY

## 2023-02-28 PROCEDURE — G8428 CUR MEDS NOT DOCUMENT: HCPCS | Performed by: UROLOGY

## 2023-02-28 PROCEDURE — 3017F COLORECTAL CA SCREEN DOC REV: CPT | Performed by: UROLOGY

## 2023-02-28 PROCEDURE — G8484 FLU IMMUNIZE NO ADMIN: HCPCS | Performed by: UROLOGY

## 2023-02-28 PROCEDURE — 99214 OFFICE O/P EST MOD 30 MIN: CPT | Performed by: UROLOGY

## 2023-02-28 RX ORDER — OMEGA-3S/DHA/EPA/FISH OIL/D3 300MG-1000
400 CAPSULE ORAL DAILY
COMMUNITY

## 2023-02-28 NOTE — PROGRESS NOTES
Koby Morris [place] [address]  Dept: [phone]  Dept Fax: [phone]  Loc: [address] [facility] Urology Office Note -     Patient:  Dominique Servin  YOB: 1955    The patient is a 80 y.o. male who presents today for evaluation of the following problems:   Chief Complaint   Patient presents with    Follow-up    Results     Review results of PSA    Benign Prostatic Hypertrophy     With LUTS    Elevated PSA     4.32        History of Present Illness:    Elevated PSA  Hx of biopsy with bridget in 2020 and myself 2022  Psa essentially stable. BPH  Weak stream at times/ Has retention issues  Has improved with flomax  80 gram prostate          Summary of Previous Records:  36-year-old white male returns today for a follow-up. His PSA was rising in June, 2020. He underwent ultrasound-guided prostate biopsies. Fortunately, there was no malignancy found. He has not had a PSA since 4/29/2020 when it was found to be 4.17 NG/mL. He has varying symptoms of NF and stream variation. Exam: wnwd male alert and oriented x 3. KIARA: normal sphincter tone. Prostate is smooth, symmetric, no nodules. UA: wnl  He will get psa with PCP. If normal, see prn. In excess of 15 minutes was spent with the patient discussing their medical history, treatment outcomes and possible treatment related side effects. Greater than 50 per cent of this time was spent in face to face discussion of current disease status and ongoing management.         Requested/reviewed records from Eleno Dewitt MD office and/or outside physician/EMR    (Patient's old records have been requested, reviewed and pertinent findings summarized in today's note.)    Procedures Today: N/A    Last several PSA's:  Lab Results   Component Value Date    PSA 4.32 02/22/2023    PSA 4.17 (H) 04/29/2020       Last total testosterone:  No results found for: TESTOSTERONE    Urinalysis today:  Results for POC orders placed in visit on 02/28/23   POCT Urinalysis No Micro (Auto)   Result Value Ref Range    Glucose, Ur Negative NEGATIVE mg/dl    Bilirubin Urine Negative     Ketones, Urine Negative NEGATIVE    Specific Gravity, Urine >= 1.030 1.002 - 1.030    Blood, UA POC Negative NEGATIVE    pH, Urine 5.50 5.0 - 9.0    Protein, Urine Trace (A) NEGATIVE mg/dl    Urobilinogen, Urine 0.20 0.0 - 1.0 eu/dl    Nitrite, Urine Negative NEGATIVE    Leukocyte Clumps, Urine Negative NEGATIVE    Color, Urine Dark yellow (A) YELLOW-STRAW    Character, Urine Clear CLR-SL.CLOUD   poct post void residual   Result Value Ref Range    post void residual 115 ml       Last BUN and creatinine:  Lab Results   Component Value Date    BUN 15 02/27/2023     Lab Results   Component Value Date    CREATININE 1.06 02/27/2023       Imaging Reviewed during this Office Visit:   imaging independently reviewed by Neymar Aponte MD and radiology report verified demonstrating     MRI PROSTATE W WO CONTRAST    Result Date: 7/26/2022  PROCEDURE: MRI PROSTATE W WO CONTRAST CLINICAL INFORMATION: Elevated PSA COMPARISON: None TECHNIQUE: Axial T2, coronal T2 and sagittal T2 imaging of the prostate gland. Large field of view axial T2 imaging of the prostate gland. Axial T1, axial T1 VIBE dynamic post stef and axial T1 VIBE dynamic subtracted post stef images through the prostate gland. Diffusion 50, 800, 1400 and ADC maps through the prostate gland. Axial T1 STAR VIBE post stef through the pelvis. 3-D images reconstructed on a separate [employer] Cad workstation with MRI TRUS fusion of prostate mass lesions. CONTRAST: 15 mL of ProHance  intravenously. LABORATORY: 1. PSA on 6/2/2022 was 5.5 ng/ml 2. PSA on 2/15/2022 was 5.89 ng/ml 3. PSA on 2/15/2021 was 3.91 ng/ml FINDINGS: PROSTATE SIZE: (As measured on Peerio cad workstation) 1.  The prostate gland is moderately to markedly enlarged measuring 5.3 x 4.9 x 6 cm. 2. The prostate volume is 75.86 ml. TRANSITIONAL ZONE: 1. Enlarged and nodular transitional zone as can be seen with benign prostatic hypertrophy. PERIPHERAL ZONE: 1. Areas of intermediate T2 signal along with areas of T2 hyperintensity noted. PROSTATE LESIONS: 1. Lesion #: 1 Location: Right posterior peripheral zone at the apex of the prostate (series 4, image 16) Size: 1.6 x 1.1 cm T2: Hypointense ADC: Mildly hypointense DWI: Isointense DCE: Positive Prostate margin: Abuts the prostatic margin and extraprostatic extension is not excluded Overall PI-RADS category: 4 /5 2. Lesion #: 2 Location: Right posterior transitional zone at the base of the prostate (series 4, image 10) Size: 1.4 x 1.4 cm T2: Hypointense ADC: Hypointense DWI: Hyperintense DCE: Positive Prostate margin: Intact Overall PI-RADS category: 4 /5 3. Lesion #: 3 Location: Right posterior transitional zone at the apex the prostate (series 4, image 15) Size: 1.3 x 0.8 cm T2: Hypointense ADC: Hypointense DWI: Isointense DCE: Positive Prostate margin: Intact Overall PI-RADS category: 3 /5 SEMINAL VESICLES: Unremarkable. NEUROVASCULAR BUNDLES: Involvement of the right neurovascular bundle cannot be excluded. URINARY BLADDER/RECTUM/PELVIC DIAPHRAGM: Circumferential bladder wall thickening as can be seen with chronic urinary retention. The rectum and urogenital diaphragm are unremarkable. PATHOLOGICALLY ENLARGED LYMPH NODES: 1. None. OSSEOUS STRUCTURES: 1. No suspicious osseous lesion. OTHER: 1. Colonic diverticulosis. 1. A 1.6 x 1.1 cm focal lesion in the right posterior peripheral zone at the apex of prostate is a PI-RADS 4 lesion. 2. A 1.4 x 1.4 cm focal lesion in the right posterior transitional zone at the base of the prostate is a PI-RADS 4 lesion 3. A 1.3 x 0.8 cm focal lesion in the right posterior transitional zone at the apex of the prostate is a PI-RADS 3 lesion. 4. Overall PI-RADS assessment is PI-RADS 4 5.  Moderate to marked prostatomegaly. 6. Circumferential bladder wall thickening as can be seen with chronic urinary retention. 7. Right neurovascular bundle involvement cannot be excluded. 8. Enlarged and nodular transitional zone as can be seen with benign prostatic hypertrophy. PI-RADS v 2.1 assessment categories PI-RADS 1: Very low (clinically significant cancer is highly unlikely to be present) PI-RADS 2: Low (clinically significant cancer is unlikely to be present) PI-RADS 3: Intermediate (the presence of clinically significant cancer is equivocal) PI-RADS 4: High (clinically significant cancer is likely to be present) PI-RADS-5: Very high (clinically significant cancer is highly likely to be present) **This report has been created using voice recognition software. It may contain minor errors which are inherent in voice recognition technology. ** Final report electronically signed by Dr Marnie Lam on 7/26/2022 10:00 AM      PAST MEDICAL, FAMILY AND SOCIAL HISTORY:  Past Medical History:   Diagnosis Date    BPH (benign prostatic hyperplasia)     Esophagitis, acute 12/2020    [facility]  egd    GERD (gastroesophageal reflux disease)     Headache(784.0)     Schatzki's ring     Varicose veins      Past Surgical History:   Procedure Laterality Date    COLONOSCOPY  2007 and  2015 2018    fam  hx  of  sibling    nanci     KNEE ARTHROSCOPY Left 1998    PROSTATE BIOPSY  08/2022    (-)    PROSTATE BIOPSY  07/2022    Dr Josr Canales @ [facility]    ULTRASOUND PROSTATE/TRANSRECTAL N/A 06/22/2020    TRANSRECTAL ULTRASOUND WITH PROSTATE  BIOPSY performed by Van Beasley MD at [facility] N/A 11/2020    done on  and   [facility]     Family History   Problem Relation Age of Onset    Cancer Sister         colon  cancer    Cancer Brother         colon cancer    Heart Disease Mother     Stroke Mother     Diabetes Father      Outpatient Medications Marked as Taking for the 2/28/23 encounter (Office Visit) with Victoria Singh MD   Medication Sig Dispense Refill    vitamin D3 (CHOLECALCIFEROL) 10 MCG (400 UNIT) TABS tablet Take 400 Units by mouth daily      omeprazole (PRILOSEC) 20 MG delayed release capsule Take 20 mg by mouth daily      turmeric 500 MG CAPS Take by mouth daily      vitamin D 25 MCG (1000 UT) CAPS Take by mouth daily      Methylsulfonylmethane 1000 MG CAPS Take by mouth daily      Coenzyme Q10 (CO Q 10) 100 MG CAPS Take 1 capsule by mouth daily      multivitamin (THERAGRAN) per tablet Take 1 tablet by mouth daily. vitamin B-12 (CYANOCOBALAMIN) 1000 MCG tablet Take 1,000 mcg by mouth daily. Patient has no known allergies. Social History     Tobacco Use   Smoking Status Never   Smokeless Tobacco Never      (If patient a smoker, smoking cessation counseling offered)   Social History     Substance and Sexual Activity   Alcohol Use Not Currently    Comment: Moderate on weekends but none the last month       REVIEW OF SYSTEMS:  Constitutional: negative  Eyes: negative  Respiratory: negative  Cardiovascular: negative  Gastrointestinal: negative  Genitourinary: see HPI  Musculoskeletal: negative  Skin: negative   Neurological: negative  Hematological/Lymphatic: negative  Psychological: negative      Physical Exam:    This a 80 y.o. male  Vitals:    02/28/23 0811   Resp: 15     Body mass index is 26.43 kg/m². Constitutional: Patient in no acute distress;         Assessment and Plan        1. Benign localized prostatic hyperplasia with lower urinary tract symptoms (LUTS)    2. Elevated PSA               Plan:      Elevated psa- two biopsies negative (2020 and now 2022). Psa monitoring in six months. BPH- better with flomax. Auass 12 mostly satisfied. 80 g prostate. PSA in six months        Prescriptions Ordered:  No orders of the defined types were placed in this encounter.      Orders Placed:  Orders Placed This Encounter   Procedures    POCT Urinalysis No Micro (Auto)    poct post void residual Bladder scan              BONI Arias MD

## 2023-08-28 NOTE — TELEPHONE ENCOUNTER
Aditi Castañeda called requesting a refill on the following medications:  Requested Prescriptions     Pending Prescriptions Disp Refills    tamsulosin (FLOMAX) 0.4 MG capsule [Pharmacy Med Name: TAMSULOSIN HCL 0.4 MG CAPSULE] 90 capsule 3     Sig: TAKE 1 747 Jessica verified:  .pv      Date of last visit: 02/28/2023  Date of next visit (if applicable): 5/61/8426

## 2023-08-29 ENCOUNTER — OFFICE VISIT (OUTPATIENT)
Dept: UROLOGY | Age: 68
End: 2023-08-29
Payer: MEDICARE

## 2023-08-29 VITALS — BODY MASS INDEX: 26.22 KG/M2 | HEIGHT: 69 IN | RESPIRATION RATE: 16 BRPM | WEIGHT: 177 LBS

## 2023-08-29 DIAGNOSIS — R97.20 ELEVATED PSA: ICD-10-CM

## 2023-08-29 DIAGNOSIS — N40.1 BENIGN LOCALIZED PROSTATIC HYPERPLASIA WITH LOWER URINARY TRACT SYMPTOMS (LUTS): Primary | ICD-10-CM

## 2023-08-29 PROCEDURE — G8427 DOCREV CUR MEDS BY ELIG CLIN: HCPCS | Performed by: UROLOGY

## 2023-08-29 PROCEDURE — G8417 CALC BMI ABV UP PARAM F/U: HCPCS | Performed by: UROLOGY

## 2023-08-29 PROCEDURE — 3017F COLORECTAL CA SCREEN DOC REV: CPT | Performed by: UROLOGY

## 2023-08-29 PROCEDURE — 1036F TOBACCO NON-USER: CPT | Performed by: UROLOGY

## 2023-08-29 PROCEDURE — 1123F ACP DISCUSS/DSCN MKR DOCD: CPT | Performed by: UROLOGY

## 2023-08-29 PROCEDURE — 99214 OFFICE O/P EST MOD 30 MIN: CPT | Performed by: UROLOGY

## 2023-08-29 RX ORDER — TAMSULOSIN HYDROCHLORIDE 0.4 MG/1
CAPSULE ORAL
Qty: 90 CAPSULE | Refills: 3 | OUTPATIENT
Start: 2023-08-29

## 2023-08-29 RX ORDER — TAMSULOSIN HYDROCHLORIDE 0.4 MG/1
0.4 CAPSULE ORAL DAILY
Qty: 90 CAPSULE | Refills: 3 | Status: SHIPPED | OUTPATIENT
Start: 2023-08-29 | End: 2023-11-27

## 2023-08-29 NOTE — PROGRESS NOTES
been created using voice recognition software. It may contain minor errors which are inherent in voice recognition technology. ** Final report electronically signed by Dr Rhys Lamar on 7/26/2022 10:00 AM      PAST MEDICAL, FAMILY AND SOCIAL HISTORY:  Past Medical History:   Diagnosis Date    BPH (benign prostatic hyperplasia)     Esophagitis, acute 12/2020    [facility]  egd    GERD (gastroesophageal reflux disease)     Headache(784.0)     Schatzki's ring     Varicose veins      Past Surgical History:   Procedure Laterality Date    COLONOSCOPY  2007 and  2015 2018    fam  hx  of  sibling    [facility]     KNEE ARTHROSCOPY Left 1998    PROSTATE BIOPSY  08/2022    (-)    PROSTATE BIOPSY  07/2022    Dr Jann Barraza @ [facility]    ULTRASOUND PROSTATE/TRANSRECTAL N/A 06/22/2020    TRANSRECTAL ULTRASOUND WITH PROSTATE  BIOPSY performed by Mell Teran MD at [facility] 11/2020    done on  and   [facility]     Family History   Problem Relation Age of Onset    Cancer Sister         colon  cancer    Cancer Brother         colon cancer    Heart Disease Mother     Stroke Mother     Diabetes Father      Outpatient Medications Marked as Taking for the 8/29/23 encounter (Office Visit) with Olga Bauman MD   Medication Sig Dispense Refill    vitamin D3 (CHOLECALCIFEROL) 10 MCG (400 UNIT) TABS tablet Take 1 tablet by mouth daily      omeprazole (PRILOSEC) 20 MG delayed release capsule Take 1 capsule by mouth daily      turmeric 500 MG CAPS Take by mouth daily      Methylsulfonylmethane 1000 MG CAPS Take by mouth daily      Coenzyme Q10 (CO Q 10) 100 MG CAPS Take 1 capsule by mouth daily      multivitamin (THERAGRAN) per tablet Take 1 tablet by mouth daily      vitamin B-12 (CYANOCOBALAMIN) 1000 MCG tablet Take 1 tablet by mouth daily         Patient has no known allergies.   Social History     Tobacco Use   Smoking Status Never   Smokeless Tobacco Never      (If patient a smoker, smoking cessation

## 2023-09-27 ENCOUNTER — OFFICE VISIT (OUTPATIENT)
Dept: FAMILY MEDICINE CLINIC | Age: 68
End: 2023-09-27

## 2023-09-27 VITALS
HEART RATE: 60 BPM | DIASTOLIC BLOOD PRESSURE: 84 MMHG | WEIGHT: 177.38 LBS | BODY MASS INDEX: 26.27 KG/M2 | SYSTOLIC BLOOD PRESSURE: 128 MMHG | RESPIRATION RATE: 12 BRPM | HEIGHT: 69 IN

## 2023-09-27 DIAGNOSIS — I49.1 PAC (PREMATURE ATRIAL CONTRACTION): Primary | ICD-10-CM

## 2023-09-27 DIAGNOSIS — I49.9 IRREGULAR HEART BEAT: ICD-10-CM

## 2023-09-27 LAB
ABSOLUTE BASO #: 0.07 K/UL (ref 0–0.2)
ABSOLUTE EOS #: 0.16 K/UL (ref 0–0.5)
ABSOLUTE LYMPH #: 2.42 K/UL (ref 1–4)
ABSOLUTE MONO #: 0.57 K/UL (ref 0.2–1)
ABSOLUTE NEUT #: 3.96 K/UL (ref 1.5–7.5)
BASOPHILS RELATIVE PERCENT: 1 %
EOSINOPHILS RELATIVE PERCENT: 2.2 %
HCT VFR BLD CALC: 46.9 % (ref 40–51)
HEMOGLOBIN: 15.9 G/DL (ref 13.5–17)
LYMPHOCYTE %: 33.6 %
MCH RBC QN AUTO: 29.8 PG (ref 25–33)
MCHC RBC AUTO-ENTMCNC: 33.9 G/DL (ref 31–36)
MCV RBC AUTO: 88 FL (ref 80–99)
MONOCYTES # BLD: 7.9 %
NEUTROPHILS RELATIVE PERCENT: 55 %
PDW BLD-RTO: 12.7 % (ref 11.5–15)
PLATELETS: 317 K/UL (ref 130–400)
PMV BLD AUTO: 9.5 FL (ref 9.3–13)
RBC: 5.33 M/UL (ref 4.5–6.1)
WBC: 7.2 K/UL (ref 3.5–11)

## 2023-09-27 PROCEDURE — G8417 CALC BMI ABV UP PARAM F/U: HCPCS | Performed by: FAMILY MEDICINE

## 2023-09-27 PROCEDURE — 1036F TOBACCO NON-USER: CPT | Performed by: FAMILY MEDICINE

## 2023-09-27 PROCEDURE — 99214 OFFICE O/P EST MOD 30 MIN: CPT | Performed by: FAMILY MEDICINE

## 2023-09-27 PROCEDURE — 1123F ACP DISCUSS/DSCN MKR DOCD: CPT | Performed by: FAMILY MEDICINE

## 2023-09-27 PROCEDURE — G8427 DOCREV CUR MEDS BY ELIG CLIN: HCPCS | Performed by: FAMILY MEDICINE

## 2023-09-27 PROCEDURE — 93000 ELECTROCARDIOGRAM COMPLETE: CPT | Performed by: FAMILY MEDICINE

## 2023-09-27 PROCEDURE — 3017F COLORECTAL CA SCREEN DOC REV: CPT | Performed by: FAMILY MEDICINE

## 2023-09-27 ASSESSMENT — ENCOUNTER SYMPTOMS
BLOOD IN STOOL: 0
CHEST TIGHTNESS: 0
BACK PAIN: 0
CONSTIPATION: 0
EYE PAIN: 0
COUGH: 0
SHORTNESS OF BREATH: 0
NAUSEA: 0
SORE THROAT: 0
ABDOMINAL PAIN: 0
TROUBLE SWALLOWING: 0

## 2023-09-27 NOTE — PROGRESS NOTES
Subjective:      Patient ID: Meka Pereyra is a 80 y.o. male. Noted irregular  beats  with  colonoscopy    and  stable  and  with prep    with    loose  stool           Palpitations   This is a new problem. The current episode started in the past 7 days. Pertinent negatives include no anxiety, chest fullness, chest pain, coughing, dizziness, fever, nausea, shortness of breath or weakness. Associated symptoms comments:   Pac  noted . He has tried nothing for the symptoms. The treatment provided no relief.      Past Medical History:   Diagnosis Date    BPH (benign prostatic hyperplasia)     Esophagitis, acute 12/2020    [facility]  egd    GERD (gastroesophageal reflux disease)     Headache(784.0)     Schatzki's ring     Varicose veins      Past Surgical History:   Procedure Laterality Date    COLONOSCOPY  2007 and  2015 2018    fam  hx  of  sibling    [facility]     KNEE ARTHROSCOPY Left 1998    PROSTATE BIOPSY  08/2022    (-)    PROSTATE BIOPSY  07/2022    Dr Dona Ray @ [facility]    ULTRASOUND PROSTATE/TRANSRECTAL N/A 06/22/2020    TRANSRECTAL ULTRASOUND WITH PROSTATE  BIOPSY performed by Jakob Jorgensen MD at [address] [place] [address] N/A 11/2020    done on  and   [facility]     Social History     Socioeconomic History    Marital status:      Spouse name: Not on file    Number of children: Not on file    Years of education: Not on file    Highest education level: Not on file   Occupational History    Not on file   Tobacco Use    Smoking status: Never    Smokeless tobacco: Never   Substance and Sexual Activity    Alcohol use: Not Currently     Comment: Moderate on weekends but none the last month    Drug use: No    Sexual activity: Yes     Partners: Female   Other Topics Concern    Not on file   Social History Narrative    Not on file     Social Determinants of Health     Financial Resource Strain: Low Risk  (2/24/2023)    Overall Financial Resource Strain (CARDIA)     Difficulty

## 2023-09-27 NOTE — PROGRESS NOTES
48-Hour Holter @ [facility] on Friday 10-6-2023 at 3:15pm. Patient to arrive at 3pm to [address]. No prep. Mary Kate Baca informed by Carly.

## 2023-09-28 ENCOUNTER — TELEPHONE (OUTPATIENT)
Dept: FAMILY MEDICINE CLINIC | Age: 68
End: 2023-09-28

## 2023-09-28 LAB
ANION GAP SERPL CALCULATED.3IONS-SCNC: 9 MEQ/L (ref 7–16)
BUN BLDV-MCNC: 15 MG/DL (ref 8–23)
CALCIUM SERPL-MCNC: 9.5 MG/DL (ref 8.5–10.5)
CHLORIDE BLD-SCNC: 102 MEQ/L (ref 95–107)
CO2: 28 MEQ/L (ref 19–31)
CREAT SERPL-MCNC: 1.15 MG/DL (ref 0.8–1.4)
EGFR IF NONAFRICAN AMERICAN: 70 ML/MIN/1.73
GLUCOSE: 98 MG/DL (ref 70–99)
MAGNESIUM: 2.2 MG/DL (ref 1.6–2.6)
POTASSIUM SERPL-SCNC: 4.4 MEQ/L (ref 3.5–5.4)
SODIUM BLD-SCNC: 139 MEQ/L (ref 133–146)
TSH SERPL DL<=0.05 MIU/L-ACNC: 3.44 UIU/ML (ref 0.4–4.1)

## 2023-09-28 NOTE — TELEPHONE ENCOUNTER
----- Message from Leatha Mackenzie MD sent at 9/28/2023 12:37 AM EDT -----  Call as all labs are normal  await holter

## 2023-10-06 ENCOUNTER — HOSPITAL ENCOUNTER (OUTPATIENT)
Dept: NON INVASIVE DIAGNOSTICS | Age: 68
Discharge: HOME | End: 2023-10-06
Attending: FAMILY MEDICINE
Payer: MEDICARE

## 2023-10-06 DIAGNOSIS — I49.9 IRREGULAR HEART BEAT: ICD-10-CM

## 2023-10-06 DIAGNOSIS — I49.1 PAC (PREMATURE ATRIAL CONTRACTION): ICD-10-CM

## 2023-10-06 PROCEDURE — 93225 XTRNL ECG REC<48 HRS REC: CPT

## 2023-10-06 PROCEDURE — 93226 XTRNL ECG REC<48 HR SCAN A/R: CPT

## 2023-10-06 NOTE — PROCEDURES
48 hour Holter monitor applied. Instructions given and patient had no further questions to this time.  Adrian Garcia CET

## 2023-10-30 ENCOUNTER — OFFICE VISIT (OUTPATIENT)
Dept: FAMILY MEDICINE CLINIC | Age: 68
End: 2023-10-30

## 2023-10-30 VITALS
BODY MASS INDEX: 26.4 KG/M2 | RESPIRATION RATE: 16 BRPM | WEIGHT: 178.25 LBS | SYSTOLIC BLOOD PRESSURE: 114 MMHG | HEIGHT: 69 IN | HEART RATE: 64 BPM | DIASTOLIC BLOOD PRESSURE: 74 MMHG

## 2023-10-30 DIAGNOSIS — Z23 NEED FOR INFLUENZA VACCINATION: ICD-10-CM

## 2023-10-30 DIAGNOSIS — I49.1 PAC (PREMATURE ATRIAL CONTRACTION): Primary | ICD-10-CM

## 2023-10-30 PROCEDURE — 99213 OFFICE O/P EST LOW 20 MIN: CPT | Performed by: FAMILY MEDICINE

## 2023-10-30 PROCEDURE — 1123F ACP DISCUSS/DSCN MKR DOCD: CPT | Performed by: FAMILY MEDICINE

## 2023-10-30 PROCEDURE — G8427 DOCREV CUR MEDS BY ELIG CLIN: HCPCS | Performed by: FAMILY MEDICINE

## 2023-10-30 PROCEDURE — 1036F TOBACCO NON-USER: CPT | Performed by: FAMILY MEDICINE

## 2023-10-30 PROCEDURE — 90688 IIV4 VACCINE SPLT 0.5 ML IM: CPT | Performed by: FAMILY MEDICINE

## 2023-10-30 PROCEDURE — G8417 CALC BMI ABV UP PARAM F/U: HCPCS | Performed by: FAMILY MEDICINE

## 2023-10-30 PROCEDURE — G0008 ADMIN INFLUENZA VIRUS VAC: HCPCS | Performed by: FAMILY MEDICINE

## 2023-10-30 PROCEDURE — 3017F COLORECTAL CA SCREEN DOC REV: CPT | Performed by: FAMILY MEDICINE

## 2023-10-30 RX ORDER — TAMSULOSIN HYDROCHLORIDE 0.4 MG/1
0.4 CAPSULE ORAL DAILY
COMMUNITY

## 2023-10-30 RX ORDER — METOPROLOL SUCCINATE 25 MG/1
25 TABLET, EXTENDED RELEASE ORAL DAILY
Qty: 30 TABLET | Refills: 3 | Status: SHIPPED | OUTPATIENT
Start: 2023-10-30

## 2023-10-30 ASSESSMENT — ENCOUNTER SYMPTOMS
BLOOD IN STOOL: 0
TROUBLE SWALLOWING: 0
EYE PAIN: 0
BACK PAIN: 0
SHORTNESS OF BREATH: 0
NAUSEA: 0
CHEST TIGHTNESS: 0
SORE THROAT: 0
ABDOMINAL PAIN: 0
COUGH: 0
CONSTIPATION: 0

## 2023-10-30 NOTE — PROGRESS NOTES
Immunizations Administered       Name Date Dose Route    Influenza, AFLURIA (age 1 yrs+), FLUZONE, (age 10 mo+), MDV, 0.5mL 10/30/2023 0.5 mL Intramuscular    Site: Deltoid- Right    Lot: [id number]    NDC: 22987-534-69

## 2023-10-30 NOTE — PROGRESS NOTES
ECHO @ [facility] on Friday 11-3-2023 at 3:15pm. Patient to arrive at 3pm to [address]. No prep. Informed at appt today.

## 2023-11-06 ENCOUNTER — HOSPITAL ENCOUNTER (OUTPATIENT)
Age: 68
Discharge: HOME | End: 2023-11-08
Attending: FAMILY MEDICINE
Payer: MEDICARE

## 2023-11-06 VITALS
BODY MASS INDEX: 26.36 KG/M2 | DIASTOLIC BLOOD PRESSURE: 74 MMHG | WEIGHT: 178 LBS | HEIGHT: 69 IN | SYSTOLIC BLOOD PRESSURE: 114 MMHG

## 2023-11-06 DIAGNOSIS — I49.1 PAC (PREMATURE ATRIAL CONTRACTION): ICD-10-CM

## 2023-11-06 LAB
ECHO AO ASC DIAM: 3.5 CM
ECHO AO ASCENDING AORTA INDEX: 1.78 CM/M2
ECHO AO SINUS VALSALVA DIAM: 3.2 CM
ECHO AO SINUS VALSALVA INDEX: 1.62 CM/M2
ECHO AO ST JNCT DIAM: 2.7 CM
ECHO AV CUSP MM: 2 CM
ECHO AV MEAN GRADIENT: 7 MMHG
ECHO AV MEAN VELOCITY: 1.2 M/S
ECHO AV PEAK GRADIENT: 12 MMHG
ECHO AV PEAK VELOCITY: 1.8 M/S
ECHO AV VELOCITY RATIO: 0.67
ECHO AV VTI: 40.1 CM
ECHO BSA: 1.98 M2
ECHO LA AREA 2C: 12.3 CM2
ECHO LA AREA 4C: 12 CM2
ECHO LA DIAMETER INDEX: 1.73 CM/M2
ECHO LA DIAMETER: 3.4 CM
ECHO LA MAJOR AXIS: 4.8 CM
ECHO LA MINOR AXIS: 4.5 CM
ECHO LA VOL A-L A2C: 28 ML (ref 18–58)
ECHO LA VOL A-L A4C: 22 ML (ref 18–58)
ECHO LA VOL BP: 26 ML (ref 18–58)
ECHO LA VOL/BSA BIPLANE: 13 ML/M2 (ref 16–34)
ECHO LA VOLUME INDEX A-L A2C: 14 ML/M2 (ref 16–34)
ECHO LA VOLUME INDEX A-L A4C: 11 ML/M2 (ref 16–34)
ECHO LV E' LATERAL VELOCITY: 12 CM/S
ECHO LV E' SEPTAL VELOCITY: 8 CM/S
ECHO LV FRACTIONAL SHORTENING: 35 % (ref 28–44)
ECHO LV INTERNAL DIMENSION DIASTOLE INDEX: 2.18 CM/M2
ECHO LV INTERNAL DIMENSION DIASTOLIC: 4.3 CM (ref 4.2–5.9)
ECHO LV INTERNAL DIMENSION SYSTOLIC INDEX: 1.42 CM/M2
ECHO LV INTERNAL DIMENSION SYSTOLIC: 2.8 CM
ECHO LV ISOVOLUMETRIC RELAXATION TIME (IVRT): 88 MS
ECHO LV IVSD: 1.1 CM (ref 0.6–1)
ECHO LV MASS 2D: 152.6 G (ref 88–224)
ECHO LV MASS INDEX 2D: 77.4 G/M2 (ref 49–115)
ECHO LV POSTERIOR WALL DIASTOLIC: 1 CM (ref 0.6–1)
ECHO LV RELATIVE WALL THICKNESS RATIO: 0.47
ECHO LVOT AV VTI INDEX: 0.63
ECHO LVOT MEAN GRADIENT: 3 MMHG
ECHO LVOT PEAK GRADIENT: 6 MMHG
ECHO LVOT PEAK VELOCITY: 1.2 M/S
ECHO LVOT VTI: 25.1 CM
ECHO MV A VELOCITY: 0.73 M/S
ECHO MV E DECELERATION TIME (DT): 278 MS
ECHO MV E VELOCITY: 0.88 M/S
ECHO MV E/A RATIO: 1.21
ECHO MV E/E' LATERAL: 7.33
ECHO MV E/E' RATIO (AVERAGED): 9.17
ECHO MV E/E' SEPTAL: 11
ECHO MV REGURGITANT PEAK GRADIENT: 96 MMHG
ECHO MV REGURGITANT PEAK VELOCITY: 4.9 M/S
ECHO PV MAX VELOCITY: 0.8 M/S
ECHO PV PEAK GRADIENT: 2 MMHG
ECHO RV INTERNAL DIMENSION: 2.7 CM
ECHO RV TAPSE: 2.5 CM (ref 1.7–?)
ECHO TV E WAVE: 0.6 M/S
ECHO TV REGURGITANT MAX VELOCITY: 2.54 M/S
ECHO TV REGURGITANT PEAK GRADIENT: 26 MMHG

## 2023-11-06 PROCEDURE — 93306 TTE W/DOPPLER COMPLETE: CPT | Performed by: NUCLEAR MEDICINE

## 2023-11-06 PROCEDURE — 93306 TTE W/DOPPLER COMPLETE: CPT

## 2023-11-07 ENCOUNTER — TELEPHONE (OUTPATIENT)
Dept: FAMILY MEDICINE CLINIC | Age: 68
End: 2023-11-07

## 2023-11-07 DIAGNOSIS — I49.1 PAC (PREMATURE ATRIAL CONTRACTION): Primary | ICD-10-CM

## 2023-11-07 NOTE — TELEPHONE ENCOUNTER
Holter Monitor @ [facility] on Monday 12-4-2023 at 9am. Patient to arrive at 8:45am to [address]. Bring: Photo ID and Constance Amato. Patient was informed of the Below Message however he needs informed of the Holter Monitor test day and time.

## 2023-11-07 NOTE — TELEPHONE ENCOUNTER
----- Message from Lori Ellis MD sent at 11/7/2023  6:16 AM EST -----  Echo all normal .  Assume not feeling any different     Repeat  holter do first week of December  and only need for 24 hours . Sourav Number  Stay on toprol    Order on printer

## 2023-12-04 ENCOUNTER — HOSPITAL ENCOUNTER (OUTPATIENT)
Age: 68
Discharge: HOME | End: 2023-12-06
Attending: FAMILY MEDICINE
Payer: MEDICARE

## 2023-12-04 DIAGNOSIS — I49.1 PAC (PREMATURE ATRIAL CONTRACTION): ICD-10-CM

## 2023-12-04 PROCEDURE — 93225 XTRNL ECG REC<48 HRS REC: CPT

## 2023-12-13 ENCOUNTER — OFFICE VISIT (OUTPATIENT)
Dept: FAMILY MEDICINE CLINIC | Age: 68
End: 2023-12-13

## 2023-12-13 VITALS
DIASTOLIC BLOOD PRESSURE: 68 MMHG | HEIGHT: 69 IN | SYSTOLIC BLOOD PRESSURE: 120 MMHG | WEIGHT: 179.25 LBS | RESPIRATION RATE: 18 BRPM | BODY MASS INDEX: 26.55 KG/M2 | HEART RATE: 64 BPM

## 2023-12-13 DIAGNOSIS — I49.1 PREMATURE ATRIAL CONTRACTIONS: Primary | ICD-10-CM

## 2023-12-13 DIAGNOSIS — N40.0 BENIGN PROSTATIC HYPERPLASIA WITHOUT LOWER URINARY TRACT SYMPTOMS: ICD-10-CM

## 2023-12-13 ASSESSMENT — ENCOUNTER SYMPTOMS
BACK PAIN: 0
SORE THROAT: 0
ABDOMINAL PAIN: 0
SHORTNESS OF BREATH: 0
TROUBLE SWALLOWING: 0
CONSTIPATION: 0
COUGH: 0
NAUSEA: 0
BLOOD IN STOOL: 0
CHEST TIGHTNESS: 0
EYE PAIN: 0

## 2023-12-13 NOTE — PROGRESS NOTES
Reason:   Specialty Services Required     Referred to Provider:   Mikaela Richardson MD     Requested Specialty:   Cardiac Electrophysiology     Number of Visits Requested:   1           Pac  noted  with  frequency    and     Holter  pending and  on  toprol  for  now   Sherly Parra MD

## 2023-12-17 LAB
ACQUISITION DURATION: NORMAL S
AVERAGE HEART RATE: 59 BPM
HOOKUP DATE: NORMAL
HOOKUP TIME: NORMAL
MAX HEART RATE TIME/DATE: NORMAL
MAX HEART RATE: 106 BPM
MIN HEART RATE TIME/DATE: NORMAL
MIN HEART RATE: 44 BPM
NUMBER OF QRS COMPLEXES: NORMAL
NUMBER OF SUPRAVENTRICULAR COUPLETS: 9
NUMBER OF SUPRAVENTRICULAR ECTOPICS: NORMAL
NUMBER OF SUPRAVENTRICULAR ISOLATED BEATS: NORMAL
NUMBER OF VENTRICULAR BIGEMINAL CYCLES: 0
NUMBER OF VENTRICULAR COUPLETS: 1
NUMBER OF VENTRICULAR ECTOPICS: 586

## 2024-01-29 NOTE — PATIENT INSTRUCTIONS
You may receive a survey regarding the care you received during your visit.  Your input is valuable to us.  We encourage you to complete and return your survey.  We hope you will choose us in the future for your healthcare needs.    Thank you for choosing [facility]!    Your Medical Assistant Today:  Mona KOCH      Your Provider for Today: Dr. Veras  Ph. [phone]

## 2024-01-30 ENCOUNTER — OFFICE VISIT (OUTPATIENT)
Dept: CARDIOLOGY CLINIC | Age: 69
End: 2024-01-30
Payer: MEDICARE

## 2024-01-30 VITALS
OXYGEN SATURATION: 100 % | DIASTOLIC BLOOD PRESSURE: 85 MMHG | WEIGHT: 179 LBS | BODY MASS INDEX: 26.51 KG/M2 | HEART RATE: 63 BPM | HEIGHT: 69 IN | SYSTOLIC BLOOD PRESSURE: 148 MMHG

## 2024-01-30 DIAGNOSIS — I49.1 PAC (PREMATURE ATRIAL CONTRACTION): Primary | ICD-10-CM

## 2024-01-30 PROCEDURE — G8427 DOCREV CUR MEDS BY ELIG CLIN: HCPCS | Performed by: INTERNAL MEDICINE

## 2024-01-30 PROCEDURE — 1036F TOBACCO NON-USER: CPT | Performed by: INTERNAL MEDICINE

## 2024-01-30 PROCEDURE — G8417 CALC BMI ABV UP PARAM F/U: HCPCS | Performed by: INTERNAL MEDICINE

## 2024-01-30 PROCEDURE — 1123F ACP DISCUSS/DSCN MKR DOCD: CPT | Performed by: INTERNAL MEDICINE

## 2024-01-30 PROCEDURE — G8482 FLU IMMUNIZE ORDER/ADMIN: HCPCS | Performed by: INTERNAL MEDICINE

## 2024-01-30 PROCEDURE — 99204 OFFICE O/P NEW MOD 45 MIN: CPT | Performed by: INTERNAL MEDICINE

## 2024-01-30 PROCEDURE — 93000 ELECTROCARDIOGRAM COMPLETE: CPT | Performed by: INTERNAL MEDICINE

## 2024-01-30 PROCEDURE — 3017F COLORECTAL CA SCREEN DOC REV: CPT | Performed by: INTERNAL MEDICINE

## 2024-01-30 RX ORDER — FLECAINIDE ACETATE 50 MG/1
50 TABLET ORAL 2 TIMES DAILY
Qty: 90 TABLET | Refills: 2 | Status: SHIPPED | OUTPATIENT
Start: 2024-01-30

## 2024-01-30 NOTE — PROGRESS NOTES
[facility]  [address] [facility] [address] [address]  Dept: [phone]  Cardiac Electrophysiology: Consultation Note  Patient's demographics:  Date:   1/30/2024  Patient name:              Imer Heaton  YOB: 1955  Sex:    male   MRN:   [id number]    Primary Care Physician:  Gordo Lopez MD    Cardiologist:  Gordo Lopez MD    Reason for Consultation:  Premature atrial and premature ventricular complexes.    Clinical Summary:  69/M with history of intermittent palpitation, describing them as an extra beats/forceful beat for last 4 months was noted to have premature atrial complexes on electrocardiogram and during his colonoscopy.  He underwent a Holter monitor that showed a PVC burden of 28% with short runs of atrial tachycardia.  No atrial fibrillation.  Echocardiogram showed preserved left ventricular size and function.  He was started on metoprolol without significant improvement in his symptoms.    No chest pain, shortness of breath, syncope.  No sustained rapid heart rates.  He is otherwise fairly active and exercises on a regular basis.  Denies smoking and drinks socially. Medical history: GERD, Schatzki ring, BHP and varicose veins.    Review of systems:  Constitutional: Negative for chills and fever  HENT: Negative for congestion, sinus pressure, sneezing and sore throat.    Eyes: Negative for pain, discharge, redness and itching.   Respiratory: Negative for apnea, cough  Gastrointestinal: Negative for blood in stool, constipation, diarrhea   Endocrine: Negative for cold intolerance, heat intolerance, polydipsia.  Genitourinary: Negative for dysuria, enuresis, flank pain and hematuria.   Musculoskeletal: Negative for arthralgias, joint swelling and neck pain.   Neurological: Negative for numbness and headaches.   Psychiatric/Behavioral: Negative for agitation, confusion, decreased concentration and dysphoric mood.      Past

## 2024-02-10 DIAGNOSIS — I49.1 PAC (PREMATURE ATRIAL CONTRACTION): ICD-10-CM

## 2024-02-12 RX ORDER — METOPROLOL SUCCINATE 25 MG/1
25 TABLET, EXTENDED RELEASE ORAL DAILY
Qty: 90 TABLET | Refills: 1 | Status: SHIPPED | OUTPATIENT
Start: 2024-02-12

## 2024-02-12 NOTE — TELEPHONE ENCOUNTER
Date of last visit:  12/13/2023  Date of next visit:  3/4/2024    Requested Prescriptions     Pending Prescriptions Disp Refills    metoprolol succinate (TOPROL XL) 25 MG extended release tablet [Pharmacy Med Name: METOPROLOL SUCC ER 25 MG TAB] 90 tablet 1     Sig: TAKE 1 TABLET BY MOUTH EVERY DAY

## 2024-03-03 SDOH — HEALTH STABILITY: PHYSICAL HEALTH: ON AVERAGE, HOW MANY MINUTES DO YOU ENGAGE IN EXERCISE AT THIS LEVEL?: 40 MIN

## 2024-03-03 SDOH — HEALTH STABILITY: PHYSICAL HEALTH: ON AVERAGE, HOW MANY DAYS PER WEEK DO YOU ENGAGE IN MODERATE TO STRENUOUS EXERCISE (LIKE A BRISK WALK)?: 4 DAYS

## 2024-03-03 ASSESSMENT — PATIENT HEALTH QUESTIONNAIRE - PHQ9
SUM OF ALL RESPONSES TO PHQ QUESTIONS 1-9: 0
SUM OF ALL RESPONSES TO PHQ9 QUESTIONS 1 & 2: 0
2. FEELING DOWN, DEPRESSED OR HOPELESS: 0
SUM OF ALL RESPONSES TO PHQ QUESTIONS 1-9: 0
1. LITTLE INTEREST OR PLEASURE IN DOING THINGS: 0
SUM OF ALL RESPONSES TO PHQ QUESTIONS 1-9: 0
SUM OF ALL RESPONSES TO PHQ QUESTIONS 1-9: 0

## 2024-03-03 ASSESSMENT — LIFESTYLE VARIABLES
HOW OFTEN DO YOU HAVE SIX OR MORE DRINKS ON ONE OCCASION: 1
HOW OFTEN DO YOU HAVE A DRINK CONTAINING ALCOHOL: MONTHLY OR LESS
HOW MANY STANDARD DRINKS CONTAINING ALCOHOL DO YOU HAVE ON A TYPICAL DAY: 1
HOW MANY STANDARD DRINKS CONTAINING ALCOHOL DO YOU HAVE ON A TYPICAL DAY: 1 OR 2
HOW OFTEN DO YOU HAVE A DRINK CONTAINING ALCOHOL: 2

## 2024-03-04 ENCOUNTER — OFFICE VISIT (OUTPATIENT)
Dept: FAMILY MEDICINE CLINIC | Age: 69
End: 2024-03-04

## 2024-03-04 VITALS
RESPIRATION RATE: 16 BRPM | DIASTOLIC BLOOD PRESSURE: 72 MMHG | BODY MASS INDEX: 26.14 KG/M2 | SYSTOLIC BLOOD PRESSURE: 128 MMHG | WEIGHT: 176.5 LBS | HEART RATE: 60 BPM | HEIGHT: 69 IN

## 2024-03-04 DIAGNOSIS — K21.9 GASTROESOPHAGEAL REFLUX DISEASE WITHOUT ESOPHAGITIS: ICD-10-CM

## 2024-03-04 DIAGNOSIS — I49.1 PAC (PREMATURE ATRIAL CONTRACTION): ICD-10-CM

## 2024-03-04 DIAGNOSIS — Z00.00 MEDICARE ANNUAL WELLNESS VISIT, SUBSEQUENT: Primary | ICD-10-CM

## 2024-03-04 DIAGNOSIS — N40.0 BENIGN PROSTATIC HYPERPLASIA WITHOUT LOWER URINARY TRACT SYMPTOMS: ICD-10-CM

## 2024-03-04 DIAGNOSIS — E78.5 HYPERLIPIDEMIA, UNSPECIFIED HYPERLIPIDEMIA TYPE: ICD-10-CM

## 2024-03-04 PROCEDURE — 3017F COLORECTAL CA SCREEN DOC REV: CPT | Performed by: FAMILY MEDICINE

## 2024-03-04 PROCEDURE — 99213 OFFICE O/P EST LOW 20 MIN: CPT | Performed by: FAMILY MEDICINE

## 2024-03-04 PROCEDURE — 1036F TOBACCO NON-USER: CPT | Performed by: FAMILY MEDICINE

## 2024-03-04 PROCEDURE — G8427 DOCREV CUR MEDS BY ELIG CLIN: HCPCS | Performed by: FAMILY MEDICINE

## 2024-03-04 PROCEDURE — 1123F ACP DISCUSS/DSCN MKR DOCD: CPT | Performed by: FAMILY MEDICINE

## 2024-03-04 PROCEDURE — G0438 PPPS, INITIAL VISIT: HCPCS | Performed by: FAMILY MEDICINE

## 2024-03-04 SDOH — ECONOMIC STABILITY: FOOD INSECURITY: WITHIN THE PAST 12 MONTHS, THE FOOD YOU BOUGHT JUST DIDN'T LAST AND YOU DIDN'T HAVE MONEY TO GET MORE.: NEVER TRUE

## 2024-03-04 SDOH — ECONOMIC STABILITY: FOOD INSECURITY: WITHIN THE PAST 12 MONTHS, YOU WORRIED THAT YOUR FOOD WOULD RUN OUT BEFORE YOU GOT MONEY TO BUY MORE.: NEVER TRUE

## 2024-03-04 SDOH — ECONOMIC STABILITY: INCOME INSECURITY: HOW HARD IS IT FOR YOU TO PAY FOR THE VERY BASICS LIKE FOOD, HOUSING, MEDICAL CARE, AND HEATING?: NOT HARD AT ALL

## 2024-03-04 ASSESSMENT — ENCOUNTER SYMPTOMS
SHORTNESS OF BREATH: 0
SORE THROAT: 0
COUGH: 0
BACK PAIN: 0
ABDOMINAL PAIN: 0
NAUSEA: 0
CONSTIPATION: 0
EYE PAIN: 0
CHEST TIGHTNESS: 0
TROUBLE SWALLOWING: 0
BLOOD IN STOOL: 0

## 2024-03-04 NOTE — PROGRESS NOTES
wheezing or rales.   Abdominal:      General: Bowel sounds are normal.      Palpations: Abdomen is soft. There is no mass.      Tenderness: There is no abdominal tenderness.   Musculoskeletal:         General: Normal range of motion.      Cervical back: Normal range of motion and neck supple.   Lymphadenopathy:      Cervical: No cervical adenopathy.   Skin:     General: Skin is warm and dry.      Findings: No rash.   Neurological:      Mental Status: He is alert and oriented to person, place, and time.      Cranial Nerves: No cranial nerve deficit.      Deep Tendon Reflexes: Reflexes are normal and symmetric.                  An electronic signature was used to authenticate this note.    --Gordo Lopez MD

## 2024-03-05 ENCOUNTER — TELEPHONE (OUTPATIENT)
Dept: FAMILY MEDICINE CLINIC | Age: 69
End: 2024-03-05

## 2024-03-05 LAB
ALBUMIN SERPL-MCNC: 4.8 G/DL (ref 3.5–5.2)
ALK PHOSPHATASE: 83 U/L (ref 40–125)
ALT SERPL-CCNC: 22 U/L (ref 5–50)
ANION GAP SERPL CALCULATED.3IONS-SCNC: 9 MEQ/L (ref 7–16)
AST SERPL-CCNC: 21 U/L (ref 9–50)
BILIRUB SERPL-MCNC: 1.5 MG/DL
BUN BLDV-MCNC: 12 MG/DL (ref 8–23)
CALCIUM SERPL-MCNC: 9.6 MG/DL (ref 8.5–10.5)
CHLORIDE BLD-SCNC: 101 MEQ/L (ref 95–107)
CHOLESTEROL/HDL RATIO: 4.1 RATIO
CHOLESTEROL: 194 MG/DL
CO2: 29 MEQ/L (ref 19–31)
CREAT SERPL-MCNC: 1.14 MG/DL (ref 0.8–1.4)
EGFR IF NONAFRICAN AMERICAN: 70 ML/MIN/1.73
GLUCOSE: 99 MG/DL (ref 70–99)
HDLC SERPL-MCNC: 47 MG/DL
LDL CHOLESTEROL CALCULATED: 124 MG/DL
LDL/HDL RATIO: 2.6 RATIO
MAGNESIUM: 2.1 MG/DL (ref 1.6–2.6)
POTASSIUM SERPL-SCNC: 5 MEQ/L (ref 3.5–5.4)
PSA, ULTRASENSITIVE: 4.64 NG/ML
SODIUM BLD-SCNC: 139 MEQ/L (ref 133–146)
TOTAL PROTEIN: 7.3 G/DL (ref 6.1–8.3)
TRIGL SERPL-MCNC: 117 MG/DL
VLDLC SERPL CALC-MCNC: 23 MG/DL

## 2024-03-05 NOTE — TELEPHONE ENCOUNTER
----- Message from Gordo Lopez MD sent at 3/5/2024  5:09 AM EST -----  Call as labs good as potassium and magnesium good .  Chol 194 and like at lower level as ldl 124 and want less than 100 so watch diet as mother with heart and stroke and stroke what age and father diabetes and any heart ?    Please call

## 2024-03-12 ENCOUNTER — OFFICE VISIT (OUTPATIENT)
Dept: UROLOGY | Age: 69
End: 2024-03-12
Payer: MEDICARE

## 2024-03-12 VITALS — RESPIRATION RATE: 16 BRPM | HEIGHT: 69 IN | WEIGHT: 180 LBS | BODY MASS INDEX: 26.66 KG/M2

## 2024-03-12 DIAGNOSIS — R97.20 ELEVATED PSA: ICD-10-CM

## 2024-03-12 DIAGNOSIS — N40.1 BENIGN LOCALIZED PROSTATIC HYPERPLASIA WITH LOWER URINARY TRACT SYMPTOMS (LUTS): Primary | ICD-10-CM

## 2024-03-12 PROCEDURE — 1123F ACP DISCUSS/DSCN MKR DOCD: CPT | Performed by: UROLOGY

## 2024-03-12 PROCEDURE — 99214 OFFICE O/P EST MOD 30 MIN: CPT | Performed by: UROLOGY

## 2024-03-12 PROCEDURE — 3017F COLORECTAL CA SCREEN DOC REV: CPT | Performed by: UROLOGY

## 2024-03-12 PROCEDURE — G8417 CALC BMI ABV UP PARAM F/U: HCPCS | Performed by: UROLOGY

## 2024-03-12 PROCEDURE — G8427 DOCREV CUR MEDS BY ELIG CLIN: HCPCS | Performed by: UROLOGY

## 2024-03-12 PROCEDURE — 1036F TOBACCO NON-USER: CPT | Performed by: UROLOGY

## 2024-03-12 PROCEDURE — G8482 FLU IMMUNIZE ORDER/ADMIN: HCPCS | Performed by: UROLOGY

## 2024-03-12 RX ORDER — TAMSULOSIN HYDROCHLORIDE 0.4 MG/1
0.4 CAPSULE ORAL DAILY
Qty: 90 CAPSULE | Refills: 3 | Status: SHIPPED | OUTPATIENT
Start: 2024-03-12 | End: 2024-06-10

## 2024-03-12 NOTE — PROGRESS NOTES
Navdeep Sharpe MD.     [facility] [place] [facility]  [facility]  [address]  [address]  [facility] [address]  Dept: [phone]  Dept Fax: [phone]  Loc: [phone]    [facility] Urology Office Note -     Patient:  Imer Heaton  YOB: 1955    The patient is a 69 y.o. male who presents today for evaluation of the following problems:   Chief Complaint   Patient presents with    Follow-up    Benign Prostatic Hypertrophy     Psa prior         History of Present Illness:    Elevated PSA  Hx of biopsy with bridget in 2020 and myself 2022  Psa stable    BPH  Weak stream at times/ Has retention issues  Has improved with flomax  Auass14 previously-- essentially stable  80 gram prostate          Summary of Previous Records:  65-year-old white male returns today for a follow-up.  His PSA was rising in June, 2020.  He underwent ultrasound-guided prostate biopsies.  Fortunately, there was no malignancy found.  He has not had a PSA since 4/29/2020 when it was found to be 4.17 NG/mL. He has varying symptoms of NF and stream variation.  Exam: wnwd male alert and oriented x 3.  KIARA: normal sphincter tone. Prostate is smooth, symmetric, no nodules.  UA: wnl  He will get psa with PCP. If normal, see prn.  In excess of 15 minutes was spent with the patient discussing their medical history, treatment outcomes and possible treatment related side effects. Greater than 50 per cent of this time was spent in face to face discussion of current disease status and ongoing management.        Requested/reviewed records from Gordo Lopez MD office and/or outside physician/EMR    (Patient's old records have been requested, reviewed and pertinent findings summarized in today's note.)    Procedures Today: N/A    Last several PSA's:  Lab Results   Component Value Date    PSA 4.61 09/05/2023    PSA 4.32 02/22/2023    PSA 4.17 (H) 04/29/2020       Last total testosterone:  No results found

## 2024-04-29 ENCOUNTER — HOSPITAL ENCOUNTER (OUTPATIENT)
Age: 69
Discharge: HOME | End: 2024-05-01
Attending: INTERNAL MEDICINE
Payer: MEDICARE

## 2024-04-29 DIAGNOSIS — I49.1 PAC (PREMATURE ATRIAL CONTRACTION): ICD-10-CM

## 2024-04-29 PROCEDURE — 93225 XTRNL ECG REC<48 HRS REC: CPT

## 2024-05-07 NOTE — PATIENT INSTRUCTIONS
You may receive a survey regarding the care you received during your visit.  Your input is valuable to us.  We encourage you to complete and return your survey.  We hope you will choose us in the future for your healthcare needs.    Thank you for choosing [facility]!    Your Medical Assistant Today:  Mona KOCH      Your RN Today:  Kae KOCH  Your Provider for Today: Dr. Veras  Ph. [phone]     Have a Great Day!

## 2024-05-08 ENCOUNTER — OFFICE VISIT (OUTPATIENT)
Dept: CARDIOLOGY CLINIC | Age: 69
End: 2024-05-08
Payer: MEDICARE

## 2024-05-08 VITALS
WEIGHT: 170 LBS | BODY MASS INDEX: 25.18 KG/M2 | HEIGHT: 69 IN | HEART RATE: 66 BPM | DIASTOLIC BLOOD PRESSURE: 80 MMHG | OXYGEN SATURATION: 96 % | SYSTOLIC BLOOD PRESSURE: 117 MMHG

## 2024-05-08 DIAGNOSIS — I49.1 PAC (PREMATURE ATRIAL CONTRACTION): Primary | ICD-10-CM

## 2024-05-08 LAB
ACQUISITION DURATION: NORMAL S
AVERAGE HEART RATE: 65 BPM
HOOKUP DATE: NORMAL
HOOKUP TIME: NORMAL
MAX HEART RATE TIME/DATE: NORMAL
MAX HEART RATE: 101 BPM
MIN HEART RATE TIME/DATE: NORMAL
MIN HEART RATE: 45 BPM
NUMBER OF QRS COMPLEXES: NORMAL
NUMBER OF SUPRAVENTRICULAR COUPLETS: 0
NUMBER OF SUPRAVENTRICULAR ECTOPICS: 8915
NUMBER OF SUPRAVENTRICULAR ISOLATED BEATS: 8659
NUMBER OF VENTRICULAR BIGEMINAL CYCLES: 58
NUMBER OF VENTRICULAR COUPLETS: 0
NUMBER OF VENTRICULAR ECTOPICS: 1278

## 2024-05-08 PROCEDURE — 3017F COLORECTAL CA SCREEN DOC REV: CPT | Performed by: INTERNAL MEDICINE

## 2024-05-08 PROCEDURE — 99214 OFFICE O/P EST MOD 30 MIN: CPT | Performed by: INTERNAL MEDICINE

## 2024-05-08 PROCEDURE — 1036F TOBACCO NON-USER: CPT | Performed by: INTERNAL MEDICINE

## 2024-05-08 PROCEDURE — G8417 CALC BMI ABV UP PARAM F/U: HCPCS | Performed by: INTERNAL MEDICINE

## 2024-05-08 PROCEDURE — 1123F ACP DISCUSS/DSCN MKR DOCD: CPT | Performed by: INTERNAL MEDICINE

## 2024-05-08 PROCEDURE — G8427 DOCREV CUR MEDS BY ELIG CLIN: HCPCS | Performed by: INTERNAL MEDICINE

## 2024-05-08 PROCEDURE — 93000 ELECTROCARDIOGRAM COMPLETE: CPT | Performed by: INTERNAL MEDICINE

## 2024-05-08 NOTE — PROGRESS NOTES
[facility]   Heart Center (EP)  [address] [facility] [facility] [address]  Dept: [phone]  Cardiac Electrophysiology: Follow up Note  Patient's demographics:  Date:   5/8/2024  Patient name:              Imer Heaton  YOB: 1955  Sex:    male   MRN:   [id number]    Primary Care Physician:  Gordo Lopez MD    Cardiologist:  Gordo Lopez MD    Reason for Follow up:  Premature atrial and premature ventricular complexes.    Clinical Summary:  69/M with history of intermittent palpitation was noted to have PACs and short runs of atrial tachycardia on Holter monitor, burden 28%  He was started on flecainide in 1/20/2024.  Follow-up 4 to show seen for reduction in PAC and PVC burden.  He is doing well.  No complaints today. Medical history: PVC (burden 28%), GERD, Schatzki ring, BHP and varicose veins.    Review of systems:  Constitutional: Negative for chills and fever  HENT: Negative for congestion, sinus pressure, sneezing and sore throat.    Eyes: Negative for pain, discharge, redness and itching.   Respiratory: Negative for apnea, cough  Gastrointestinal: Negative for blood in stool, constipation, diarrhea   Endocrine: Negative for cold intolerance, heat intolerance, polydipsia.  Genitourinary: Negative for dysuria, enuresis, flank pain and hematuria.   Musculoskeletal: Negative for arthralgias, joint swelling and neck pain.   Neurological: Negative for numbness and headaches.   Psychiatric/Behavioral: Negative for agitation, confusion, decreased concentration and dysphoric mood.      Past Medical History::  Past Medical History:   Diagnosis Date    BPH (benign prostatic hyperplasia)     Esophagitis, acute 12/2020    [facility]  egd    GERD (gastroesophageal reflux disease)     Headache(784.0)     Schatzki's ring     Varicose veins        Past Surgical History:  Past Surgical History:   Procedure Laterality Date    COLONOSCOPY  09/2023 2028      \[facility]

## 2024-06-07 RX ORDER — FLECAINIDE ACETATE 50 MG/1
50 TABLET ORAL 2 TIMES DAILY
Qty: 180 TABLET | Refills: 3 | Status: SHIPPED | OUTPATIENT
Start: 2024-06-07

## 2024-08-29 ENCOUNTER — TELEMEDICINE (OUTPATIENT)
Dept: FAMILY MEDICINE CLINIC | Age: 69
End: 2024-08-29

## 2024-08-29 DIAGNOSIS — I49.1 PAC (PREMATURE ATRIAL CONTRACTION): ICD-10-CM

## 2024-08-29 DIAGNOSIS — J06.9 UPPER RESPIRATORY TRACT INFECTION DUE TO COVID-19 VIRUS: Primary | ICD-10-CM

## 2024-08-29 DIAGNOSIS — U07.1 UPPER RESPIRATORY TRACT INFECTION DUE TO COVID-19 VIRUS: Primary | ICD-10-CM

## 2024-08-29 PROCEDURE — G8417 CALC BMI ABV UP PARAM F/U: HCPCS | Performed by: FAMILY MEDICINE

## 2024-08-29 PROCEDURE — 1123F ACP DISCUSS/DSCN MKR DOCD: CPT | Performed by: FAMILY MEDICINE

## 2024-08-29 PROCEDURE — G8427 DOCREV CUR MEDS BY ELIG CLIN: HCPCS | Performed by: FAMILY MEDICINE

## 2024-08-29 PROCEDURE — 99213 OFFICE O/P EST LOW 20 MIN: CPT | Performed by: FAMILY MEDICINE

## 2024-08-29 PROCEDURE — 1036F TOBACCO NON-USER: CPT | Performed by: FAMILY MEDICINE

## 2024-08-29 PROCEDURE — 3017F COLORECTAL CA SCREEN DOC REV: CPT | Performed by: FAMILY MEDICINE

## 2024-08-29 ASSESSMENT — ENCOUNTER SYMPTOMS
TROUBLE SWALLOWING: 0
COUGH: 1
EYE PAIN: 0
CONSTIPATION: 0
CHEST TIGHTNESS: 0
SHORTNESS OF BREATH: 0
BACK PAIN: 0
BLOOD IN STOOL: 0
SORE THROAT: 0
ABDOMINAL PAIN: 0
NAUSEA: 0

## 2024-08-29 NOTE — PROGRESS NOTES
Imer agreed to Video Chat/Exam in presence of Dr Lopez and myself. Verified who was present in room with Imer. Imer informed the e-mail address used to Face Time cannot be used to contact the Provider, if they are any questions or concerns they need to call the office directly. Imer stated understanding.

## 2024-08-29 NOTE — PROGRESS NOTES
Imer Heaton, was evaluated through a synchronous (real-time) audio-video encounter. The patient (or guardian if applicable) is aware that this is a billable service, which includes applicable co-pays. This Virtual Visit was conducted with patient's (and/or legal guardian's) consent. Patient identification was verified, and a caregiver was present when appropriate.   The patient was located at Home: [address] [place] [id number]  Provider was located at Facility (Appt Dept): [address] [place] [address]  Confirm you are appropriately licensed, registered, or certified to deliver care in the state where the patient is located as indicated above. If you are not or unsure, please re-schedule the visit: Yes, I confirm.     Imer Heaton (:  1955) is a Established patient, presenting virtually for evaluation of the following:      Below is the assessment and plan developed based on review of pertinent history, physical exam, labs, studies, and medications.     Assessment & Plan  Upper respiratory tract infection due to COVID-19 virus            PAC (premature atrial contraction)                ICD-10-CM    1. Upper respiratory tract infection due to COVID-19 virus  U07.1     J06.9       2. PAC (premature atrial contraction)  I49.1                PLAN        Current Outpatient Medications   Medication Sig Dispense Refill    flecainide (TAMBOCOR) 50 MG tablet TAKE 1 TABLET BY MOUTH TWICE A  tablet 3    tamsulosin (FLOMAX) 0.4 MG capsule Take 1 capsule by mouth daily 90 capsule 3    vitamin D3 (CHOLECALCIFEROL) 10 MCG (400 UNIT) TABS tablet Take 1 tablet by mouth daily      turmeric 500 MG CAPS Take by mouth daily      Methylsulfonylmethane 1000 MG CAPS Take by mouth daily      Coenzyme Q10 (CO Q 10) 100 MG CAPS Take 1 capsule by mouth daily      multivitamin (THERAGRAN) per tablet Take 1 tablet by mouth daily      vitamin B-12 (CYANOCOBALAMIN) 1000 MCG tablet Take 1 tablet by  Indicates a negative item  -- DELETE ALL ITEMS NOT EXAMINED]    Constitutional: [x] Appears well-developed and well-nourished [x] No apparent distress      [] Abnormal -     Mental status: [x] Alert and awake  [x] Oriented to person/place/time [x] Able to follow commands    [] Abnormal -     Eyes:   EOM    [x]  Normal    [] Abnormal -   Sclera  [x]  Normal    [] Abnormal -          Discharge [x]  None visible   [] Abnormal -     HENT: [x] Normocephalic, atraumatic  [] Abnormal -   [x] Mouth/Throat: Mucous membranes are moist    External Ears [x] Normal  [] Abnormal -    Neck: [x] No visualized mass [] Abnormal -     Pulmonary/Chest: [x] Respiratory effort normal   [x] No visualized signs of difficulty breathing or respiratory distress        [] Abnormal -      Musculoskeletal:   [x] Normal gait with no signs of ataxia         [x] Normal range of motion of neck        [] Abnormal -     Neurological:        [x] No Facial Asymmetry (Cranial nerve 7 motor function) (limited exam due to video visit)          [x] No gaze palsy        [] Abnormal -          Skin:        [x] No significant exanthematous lesions or discoloration noted on facial skin         [] Abnormal -            Psychiatric:       [x] Normal Affect [] Abnormal -        [x] No Hallucinations    Other pertinent observable physical exam findings:-       Keeep other  appt     On this date 8/29/2024 I have spent 25 minutes reviewing previous notes, test results and face to face (virtual) with the patient discussing the diagnosis and importance of compliance with the treatment plan as well as documenting on the day of the visit.    --Gordo Lopez MD

## 2024-09-09 ENCOUNTER — OFFICE VISIT (OUTPATIENT)
Dept: FAMILY MEDICINE CLINIC | Age: 69
End: 2024-09-09

## 2024-09-09 VITALS
BODY MASS INDEX: 25.64 KG/M2 | RESPIRATION RATE: 16 BRPM | WEIGHT: 173.13 LBS | SYSTOLIC BLOOD PRESSURE: 118 MMHG | DIASTOLIC BLOOD PRESSURE: 76 MMHG | HEIGHT: 69 IN | HEART RATE: 60 BPM

## 2024-09-09 DIAGNOSIS — Z23 NEED FOR STREPTOCOCCUS PNEUMONIAE VACCINATION: ICD-10-CM

## 2024-09-09 DIAGNOSIS — N40.0 BENIGN PROSTATIC HYPERPLASIA WITHOUT LOWER URINARY TRACT SYMPTOMS: ICD-10-CM

## 2024-09-09 DIAGNOSIS — Z23 INFLUENZA VACCINE NEEDED: ICD-10-CM

## 2024-09-09 DIAGNOSIS — K21.9 GASTROESOPHAGEAL REFLUX DISEASE WITHOUT ESOPHAGITIS: ICD-10-CM

## 2024-09-09 DIAGNOSIS — I49.1 PAC (PREMATURE ATRIAL CONTRACTION): Primary | ICD-10-CM

## 2024-09-09 PROCEDURE — 90677 PCV20 VACCINE IM: CPT | Performed by: FAMILY MEDICINE

## 2024-09-09 PROCEDURE — 3017F COLORECTAL CA SCREEN DOC REV: CPT | Performed by: FAMILY MEDICINE

## 2024-09-09 PROCEDURE — G8427 DOCREV CUR MEDS BY ELIG CLIN: HCPCS | Performed by: FAMILY MEDICINE

## 2024-09-09 PROCEDURE — G0009 ADMIN PNEUMOCOCCAL VACCINE: HCPCS | Performed by: FAMILY MEDICINE

## 2024-09-09 PROCEDURE — G0008 ADMIN INFLUENZA VIRUS VAC: HCPCS | Performed by: FAMILY MEDICINE

## 2024-09-09 PROCEDURE — 99213 OFFICE O/P EST LOW 20 MIN: CPT | Performed by: FAMILY MEDICINE

## 2024-09-09 ASSESSMENT — ENCOUNTER SYMPTOMS
BACK PAIN: 0
NAUSEA: 0
COUGH: 0
SORE THROAT: 0
BLOOD IN STOOL: 0
SHORTNESS OF BREATH: 0
TROUBLE SWALLOWING: 0
ABDOMINAL PAIN: 0
CONSTIPATION: 0
CHEST TIGHTNESS: 0
EYE PAIN: 0

## 2025-02-05 ENCOUNTER — TELEPHONE (OUTPATIENT)
Dept: FAMILY MEDICINE CLINIC | Age: 70
End: 2025-02-05

## 2025-02-05 NOTE — TELEPHONE ENCOUNTER
Destinee called from [facility]  stated pt is having surgery on right total knee replacement on March 24 2025 they are ordering all the testing     Please call [phone] ext [phone]

## 2025-03-04 SDOH — HEALTH STABILITY: PHYSICAL HEALTH: ON AVERAGE, HOW MANY MINUTES DO YOU ENGAGE IN EXERCISE AT THIS LEVEL?: 90 MIN

## 2025-03-04 SDOH — HEALTH STABILITY: PHYSICAL HEALTH: ON AVERAGE, HOW MANY DAYS PER WEEK DO YOU ENGAGE IN MODERATE TO STRENUOUS EXERCISE (LIKE A BRISK WALK)?: 4 DAYS

## 2025-03-04 ASSESSMENT — PATIENT HEALTH QUESTIONNAIRE - PHQ9
SUM OF ALL RESPONSES TO PHQ QUESTIONS 1-9: 0
2. FEELING DOWN, DEPRESSED OR HOPELESS: NOT AT ALL
1. LITTLE INTEREST OR PLEASURE IN DOING THINGS: NOT AT ALL
SUM OF ALL RESPONSES TO PHQ QUESTIONS 1-9: 0

## 2025-03-04 ASSESSMENT — LIFESTYLE VARIABLES
HOW OFTEN DO YOU HAVE A DRINK CONTAINING ALCOHOL: MONTHLY OR LESS
HOW MANY STANDARD DRINKS CONTAINING ALCOHOL DO YOU HAVE ON A TYPICAL DAY: 1
HOW OFTEN DO YOU HAVE SIX OR MORE DRINKS ON ONE OCCASION: 1
HOW MANY STANDARD DRINKS CONTAINING ALCOHOL DO YOU HAVE ON A TYPICAL DAY: 1 OR 2
HOW OFTEN DO YOU HAVE A DRINK CONTAINING ALCOHOL: 2

## 2025-03-08 LAB — PSA, ULTRASENSITIVE: 6.2 NG/ML (ref 0–4)

## 2025-03-10 SDOH — ECONOMIC STABILITY: TRANSPORTATION INSECURITY
IN THE PAST 12 MONTHS, HAS THE LACK OF TRANSPORTATION KEPT YOU FROM MEDICAL APPOINTMENTS OR FROM GETTING MEDICATIONS?: NO

## 2025-03-10 SDOH — ECONOMIC STABILITY: FOOD INSECURITY: WITHIN THE PAST 12 MONTHS, THE FOOD YOU BOUGHT JUST DIDN'T LAST AND YOU DIDN'T HAVE MONEY TO GET MORE.: NEVER TRUE

## 2025-03-10 SDOH — ECONOMIC STABILITY: FOOD INSECURITY: WITHIN THE PAST 12 MONTHS, YOU WORRIED THAT YOUR FOOD WOULD RUN OUT BEFORE YOU GOT MONEY TO BUY MORE.: NEVER TRUE

## 2025-03-10 SDOH — ECONOMIC STABILITY: INCOME INSECURITY: IN THE LAST 12 MONTHS, WAS THERE A TIME WHEN YOU WERE NOT ABLE TO PAY THE MORTGAGE OR RENT ON TIME?: NO

## 2025-03-11 ENCOUNTER — OFFICE VISIT (OUTPATIENT)
Dept: FAMILY MEDICINE CLINIC | Age: 70
End: 2025-03-11

## 2025-03-11 VITALS
SYSTOLIC BLOOD PRESSURE: 130 MMHG | RESPIRATION RATE: 16 BRPM | DIASTOLIC BLOOD PRESSURE: 74 MMHG | BODY MASS INDEX: 26.73 KG/M2 | WEIGHT: 180.5 LBS | HEART RATE: 60 BPM | HEIGHT: 69 IN

## 2025-03-11 DIAGNOSIS — R97.20 ELEVATED PSA: ICD-10-CM

## 2025-03-11 DIAGNOSIS — K21.9 GASTROESOPHAGEAL REFLUX DISEASE WITHOUT ESOPHAGITIS: ICD-10-CM

## 2025-03-11 DIAGNOSIS — Z00.00 MEDICARE ANNUAL WELLNESS VISIT, SUBSEQUENT: Primary | ICD-10-CM

## 2025-03-11 DIAGNOSIS — M17.0 PRIMARY OSTEOARTHRITIS OF BOTH KNEES: ICD-10-CM

## 2025-03-11 DIAGNOSIS — E78.5 HYPERLIPIDEMIA, UNSPECIFIED HYPERLIPIDEMIA TYPE: ICD-10-CM

## 2025-03-11 DIAGNOSIS — I49.1 PAC (PREMATURE ATRIAL CONTRACTION): ICD-10-CM

## 2025-03-11 PROCEDURE — 1036F TOBACCO NON-USER: CPT | Performed by: FAMILY MEDICINE

## 2025-03-11 PROCEDURE — G8427 DOCREV CUR MEDS BY ELIG CLIN: HCPCS | Performed by: FAMILY MEDICINE

## 2025-03-11 PROCEDURE — 1123F ACP DISCUSS/DSCN MKR DOCD: CPT | Performed by: FAMILY MEDICINE

## 2025-03-11 PROCEDURE — 99213 OFFICE O/P EST LOW 20 MIN: CPT | Performed by: FAMILY MEDICINE

## 2025-03-11 PROCEDURE — 3017F COLORECTAL CA SCREEN DOC REV: CPT | Performed by: FAMILY MEDICINE

## 2025-03-11 PROCEDURE — G8417 CALC BMI ABV UP PARAM F/U: HCPCS | Performed by: FAMILY MEDICINE

## 2025-03-11 PROCEDURE — G0439 PPPS, SUBSEQ VISIT: HCPCS | Performed by: FAMILY MEDICINE

## 2025-03-11 ASSESSMENT — ENCOUNTER SYMPTOMS
TROUBLE SWALLOWING: 0
CHEST TIGHTNESS: 0
SHORTNESS OF BREATH: 0
COUGH: 0
CONSTIPATION: 0
SORE THROAT: 0
EYE PAIN: 0
NAUSEA: 0
ABDOMINAL PAIN: 0
BACK PAIN: 0
BLOOD IN STOOL: 0

## 2025-03-11 NOTE — PROGRESS NOTES
Subjective   Patient ID: Imer Heaton is a 70 y.o. male.      Psa  slight  up   but  2  biopsy   2  years   ago  with biopsy   bph       Right  knee  total     due   for  surgery   3-24-25    Pac     stable      Gastroesophageal Reflux  He reports no abdominal pain, no chest pain, no coughing, no nausea or no sore throat. This is a chronic problem. The current episode started more than 1 year ago. The problem occurs rarely. The problem has been resolved. Pertinent negatives include no fatigue. He has tried a diet change for the symptoms. The treatment provided significant relief.     Past Medical History:   Diagnosis Date    BPH (benign prostatic hyperplasia)     Esophagitis, acute 12/2020    [facility]  egd    GERD (gastroesophageal reflux disease)     Headache(784.0)     Schatzki's ring     Varicose veins      Past Surgical History:   Procedure Laterality Date    COLONOSCOPY  09/2023 2028      \[facility]    KNEE ARTHROSCOPY Left 1998    PROSTATE BIOPSY  08/2022    (-)    PROSTATE BIOPSY  07/2022    Dr Sharpe @ [facility]    ULTRASOUND PROSTATE/TRANSRECTAL N/A 06/22/2020    TRANSRECTAL ULTRASOUND WITH PROSTATE  BIOPSY performed by Shayne Larsen MD at [facility] OR    UPPER GASTROINTESTINAL ENDOSCOPY N/A 11/2020    done on  and   [facility]     Social History     Socioeconomic History    Marital status:      Spouse name: Not on file    Number of children: Not on file    Years of education: Not on file    Highest education level: Not on file   Occupational History    Not on file   Tobacco Use    Smoking status: Never     Passive exposure: Never    Smokeless tobacco: Never   Substance and Sexual Activity    Alcohol use: Not Currently     Comment: Moderate on weekends but none the last month    Drug use: No    Sexual activity: Yes     Partners: Female   Other Topics Concern    Not on file   Social History Narrative    Not on file     Social Drivers of Health     Financial Resource Strain: Low Risk

## 2025-03-12 ENCOUNTER — OFFICE VISIT (OUTPATIENT)
Dept: UROLOGY | Age: 70
End: 2025-03-12
Payer: MEDICARE

## 2025-03-12 VITALS
WEIGHT: 180 LBS | BODY MASS INDEX: 26.66 KG/M2 | HEIGHT: 69 IN | SYSTOLIC BLOOD PRESSURE: 124 MMHG | DIASTOLIC BLOOD PRESSURE: 78 MMHG

## 2025-03-12 DIAGNOSIS — R97.20 ELEVATED PSA: ICD-10-CM

## 2025-03-12 DIAGNOSIS — N40.1 BENIGN LOCALIZED PROSTATIC HYPERPLASIA WITH LOWER URINARY TRACT SYMPTOMS (LUTS): Primary | ICD-10-CM

## 2025-03-12 DIAGNOSIS — R35.1 NOCTURIA MORE THAN TWICE PER NIGHT: ICD-10-CM

## 2025-03-12 PROCEDURE — 99214 OFFICE O/P EST MOD 30 MIN: CPT

## 2025-03-12 PROCEDURE — 3017F COLORECTAL CA SCREEN DOC REV: CPT

## 2025-03-12 PROCEDURE — 1036F TOBACCO NON-USER: CPT

## 2025-03-12 PROCEDURE — 1159F MED LIST DOCD IN RCRD: CPT

## 2025-03-12 PROCEDURE — G8417 CALC BMI ABV UP PARAM F/U: HCPCS

## 2025-03-12 PROCEDURE — G8427 DOCREV CUR MEDS BY ELIG CLIN: HCPCS

## 2025-03-12 PROCEDURE — 1123F ACP DISCUSS/DSCN MKR DOCD: CPT

## 2025-03-12 RX ORDER — TAMSULOSIN HYDROCHLORIDE 0.4 MG/1
0.4 CAPSULE ORAL 2 TIMES DAILY
Qty: 180 CAPSULE | Refills: 3 | Status: SHIPPED | OUTPATIENT
Start: 2025-03-12 | End: 2026-03-07

## 2025-03-12 RX ORDER — FINASTERIDE 5 MG/1
5 TABLET, FILM COATED ORAL DAILY
Qty: 90 TABLET | Refills: 3 | Status: SHIPPED | OUTPATIENT
Start: 2025-03-12

## 2025-03-12 NOTE — PROGRESS NOTES
[facility] [place] [facility]  [facility]  [address]  [address]  [facility] [address]  Dept: [phone]  Loc: [phone]  Visit Date: 3/12/2025      HPI  Imer Heaton is a 70 y.o. male that presents to the urology clinic for BPH and PSA check.    Elevated PSA  PSA remains elevated at 6.20 which is comparable to past checks (previous high of 5.89).  Hx of negative biopsies with Dr. Larsen in 2020 and Dr. Sharpe in 2022.    BPH  Managed on Flomax 0.4 mg QD. Symptoms are now declining significantly in the last 3-4 months.  Weak urinary stream, urinary frequency, feelings of incomplete emptying. IPSS of 22/3.    Nocturia x 2-3. Bothersome.      PSA Trend  6.20   (03/07/25)  4.64   (03/04/24)  5.50   (06/02/22)  5.89   (02/15/22)  3.91   (02/15/21)  5.05   (02/20/20)      Last BUN and Creatinine:  Lab Results   Component Value Date    BUN 12 03/04/2024     Lab Results   Component Value Date    CREATININE 1.14 03/04/2024           PAST MEDICAL, FAMILY AND SOCIAL HISTORY UPDATE:  Past Medical History:   Diagnosis Date    BPH (benign prostatic hyperplasia)     Esophagitis, acute 12/2020    [facility]  egd    GERD (gastroesophageal reflux disease)     Headache(784.0)     Schatzki's ring     Varicose veins      Past Surgical History:   Procedure Laterality Date    COLONOSCOPY  09/2023 2028      \[facility]    KNEE ARTHROSCOPY Left 1998    PROSTATE BIOPSY  08/2022    (-)    PROSTATE BIOPSY  07/2022    Dr Sharpe @ [facility]    ULTRASOUND PROSTATE/TRANSRECTAL N/A 06/22/2020    TRANSRECTAL ULTRASOUND WITH PROSTATE  BIOPSY performed by Shayne Larsen MD at [facility] OR    UPPER GASTROINTESTINAL ENDOSCOPY N/A 11/2020    done on  and   [facility]     Family History   Problem Relation Age of Onset    Cancer Sister         colon  cancer    Cancer Brother         colon cancer    Heart Disease Mother     Stroke Mother     Diabetes Father      Outpatient Medications Marked as Taking for the

## 2025-03-19 ENCOUNTER — TELEPHONE (OUTPATIENT)
Dept: FAMILY MEDICINE CLINIC | Age: 70
End: 2025-03-19

## 2025-03-19 NOTE — TELEPHONE ENCOUNTER
OIO called asking if doctor looked at the chest x ray,pt is having surgery 3/24/2025.    Please call [phone] ext [phone] once addressed

## 2025-03-31 ENCOUNTER — OFFICE VISIT (OUTPATIENT)
Dept: UROLOGY | Age: 70
End: 2025-03-31
Payer: MEDICARE

## 2025-03-31 VITALS — RESPIRATION RATE: 20 BRPM | HEIGHT: 69 IN | BODY MASS INDEX: 26.66 KG/M2 | WEIGHT: 180 LBS

## 2025-03-31 DIAGNOSIS — R97.20 ELEVATED PSA: ICD-10-CM

## 2025-03-31 DIAGNOSIS — R35.1 NOCTURIA MORE THAN TWICE PER NIGHT: ICD-10-CM

## 2025-03-31 DIAGNOSIS — N40.1 BENIGN LOCALIZED PROSTATIC HYPERPLASIA WITH LOWER URINARY TRACT SYMPTOMS (LUTS): Primary | ICD-10-CM

## 2025-03-31 DIAGNOSIS — R33.8 ACUTE URINARY RETENTION: ICD-10-CM

## 2025-03-31 PROCEDURE — 3017F COLORECTAL CA SCREEN DOC REV: CPT

## 2025-03-31 PROCEDURE — G8417 CALC BMI ABV UP PARAM F/U: HCPCS

## 2025-03-31 PROCEDURE — 1123F ACP DISCUSS/DSCN MKR DOCD: CPT

## 2025-03-31 PROCEDURE — 99214 OFFICE O/P EST MOD 30 MIN: CPT

## 2025-03-31 PROCEDURE — 1036F TOBACCO NON-USER: CPT

## 2025-03-31 PROCEDURE — G8427 DOCREV CUR MEDS BY ELIG CLIN: HCPCS

## 2025-03-31 PROCEDURE — 1159F MED LIST DOCD IN RCRD: CPT

## 2025-03-31 RX ORDER — HYDROCODONE BITARTRATE AND ACETAMINOPHEN 5; 325 MG/1; MG/1
TABLET ORAL
COMMUNITY
Start: 2025-03-24

## 2025-03-31 RX ORDER — ASPIRIN 325 MG
TABLET ORAL
COMMUNITY
Start: 2025-03-24

## 2025-03-31 NOTE — PROGRESS NOTES
[facility] [place] [facility]  [facility] UROLOGY  [address]  [address]  [facility] [address]  Dept: [phone]  Loc: [phone]  Visit Date: 3/31/2025      HPI  Imer Heaton is a 70 y.o. male that presents to the urology clinic for acute urinary retention follow-up.    Acute Urinary Retention  First instance of urinary retention 2/2 to anesthesia. Patient underwent right knee surgery on 03/24/25 after which, he was unable to urinate. Preciado catheter was placed post-operatively and has remained in place until his OV today.    BPH  Managed on Flomax 0.4 mg BID. Symptoms have declined significantly over the past 6 months despite his increase in Flomax. Weak urinary stream, urinary frequency, feelings of incomplete emptying. Nocturia x 2-3. IPSS of 22/3 at his last visit.    Elevated PSA  PSA remains elevated at 6.20 which is comparable to past checks (previous high of 5.89).  Hx of negative biopsies with Dr. Larsen in 2020 and Dr. Sharpe in 2022.      PSA Trend  6.20   (03/07/25)  4.64   (03/04/24)  5.50   (06/02/22)  5.89   (02/15/22)  3.91   (02/15/21)  5.05   (02/20/20)      Last BUN and Creatinine:  Lab Results   Component Value Date    BUN 12 03/04/2024     Lab Results   Component Value Date    CREATININE 1.14 03/04/2024           PAST MEDICAL, FAMILY AND SOCIAL HISTORY UPDATE:  Past Medical History:   Diagnosis Date    BPH (benign prostatic hyperplasia)     Esophagitis, acute 12/2020    [facility]  egd    GERD (gastroesophageal reflux disease)     Headache(784.0)     Schatzki's ring     Varicose veins      Past Surgical History:   Procedure Laterality Date    COLONOSCOPY  09/2023 2028      \[facility]    KNEE ARTHROSCOPY Left 1998    PROSTATE BIOPSY  08/2022    (-)    PROSTATE BIOPSY  07/2022    Dr Sharpe @ [facility]    ULTRASOUND PROSTATE/TRANSRECTAL N/A 06/22/2020    TRANSRECTAL ULTRASOUND WITH PROSTATE  BIOPSY performed by Shayne Larsen MD at [facility] OR    UPPER GASTROINTESTINAL

## 2025-04-01 ENCOUNTER — TELEPHONE (OUTPATIENT)
Dept: UROLOGY | Age: 70
End: 2025-04-01

## 2025-04-01 ENCOUNTER — OFFICE VISIT (OUTPATIENT)
Dept: UROLOGY | Age: 70
End: 2025-04-01
Payer: MEDICARE

## 2025-04-01 VITALS — BODY MASS INDEX: 26.66 KG/M2 | HEIGHT: 69 IN | RESPIRATION RATE: 10 BRPM | WEIGHT: 180 LBS

## 2025-04-01 DIAGNOSIS — R33.8 ACUTE URINARY RETENTION: Primary | ICD-10-CM

## 2025-04-01 LAB — POST VOID RESIDUAL (PVR): 817 ML

## 2025-04-01 PROCEDURE — 1159F MED LIST DOCD IN RCRD: CPT

## 2025-04-01 PROCEDURE — 1123F ACP DISCUSS/DSCN MKR DOCD: CPT

## 2025-04-01 PROCEDURE — 99213 OFFICE O/P EST LOW 20 MIN: CPT

## 2025-04-01 PROCEDURE — 51702 INSERT TEMP BLADDER CATH: CPT

## 2025-04-01 PROCEDURE — 51798 US URINE CAPACITY MEASURE: CPT

## 2025-04-01 PROCEDURE — 3017F COLORECTAL CA SCREEN DOC REV: CPT

## 2025-04-01 PROCEDURE — 1036F TOBACCO NON-USER: CPT

## 2025-04-01 PROCEDURE — G8427 DOCREV CUR MEDS BY ELIG CLIN: HCPCS

## 2025-04-01 PROCEDURE — G8417 CALC BMI ABV UP PARAM F/U: HCPCS

## 2025-04-01 ASSESSMENT — ENCOUNTER SYMPTOMS
DIARRHEA: 1
CONSTIPATION: 0

## 2025-04-01 NOTE — PROGRESS NOTES
Patient has given me verbal consent to perform catheter placement  Yes    DIAGNOSIS/INDICATION:  Urinary retention     Does patient have latex allergy?  No  Does patient have shellfish or betadine allergy?  No      Following Traci PATINO plan of care. Inserted 16 Fr  Catheter without difficulty.    Patient's urethra was cleansed with betadine swab. 16 Fr Coude deutsch was inserted using sterile water-soluble lubricant without difficulty and inflated balloon with 10 ml of water. Deutsch Catheter was hooked up to overnight.    Foreskin reduced back down?  N/A    Patient instructed on draining catheter bags.    Patient instructed to keep leg bag above the knee to prevent pulling on catheter causing blood.    Patient instructed on how to switch from leg bag to overnight bag.

## 2025-04-01 NOTE — PROGRESS NOTES
[facility] [place] [facility]  [facility] UROLOGY  [address]  [address]  [facility] [address]  Dept: [phone]  Loc: [phone]    Visit Date: 4/1/2025        HPI:     HPI  Mr. Heaton is a 70-year-old male that presents in follow-up.     Pt with BPH with lower urinary tract symptoms. Endorses a weak urinary stream, frequency, feelings of incomplete emptying, and nocturia for which he takes Flomax BID and finasteride 5 mg daily.     Also with an elevated PSA. He did have a negative biopsy in 2020 with Dr. Larsen and in 2022 with Dr. Sharpe. Prostate MRI in 7/2022 with PI-RADS 4 and PI-RADS 3 findings.     Presents today with acute urinary retention s/p R knee surgery on 3/24/2025. Preciado catheter removed on 3/31/2025. However, pt has been urinating very little since 18:00 on 3/31/2025. Denies gross hematuria, flank pain, and constipation.     Current Outpatient Medications   Medication Sig Dispense Refill    aspirin 325 MG tablet 1 TAB(S) ORALLY TWICE DAILY X 2 WEEKS, THEN ONCE DAILY X 2 WEEKS 28 DAYS      HYDROcodone-acetaminophen (NORCO) 5-325 MG per tablet TAKE 1 TABLET BY MOUTH EVERY 4 HOURS FOR 7 DAYS      tamsulosin (FLOMAX) 0.4 MG capsule Take 1 capsule by mouth 2 times daily 180 capsule 3    finasteride (PROSCAR) 5 MG tablet Take 1 tablet by mouth daily 90 tablet 3    flecainide (TAMBOCOR) 50 MG tablet TAKE 1 TABLET BY MOUTH TWICE A  tablet 3    vitamin D3 (CHOLECALCIFEROL) 10 MCG (400 UNIT) TABS tablet Take 1 tablet by mouth daily      Coenzyme Q10 (CO Q 10) 100 MG CAPS Take 1 capsule by mouth daily      vitamin B-12 (CYANOCOBALAMIN) 1000 MCG tablet Take 1 tablet by mouth daily       No current facility-administered medications for this visit.       Past Medical History  Imer  has a past medical history of BPH (benign prostatic hyperplasia), Esophagitis, acute, GERD (gastroesophageal reflux disease), Headache(784.0), Schatzki's ring, and Varicose veins.    Past

## 2025-04-01 NOTE — TELEPHONE ENCOUNTER
Patient had catheter removed yesterday in office was urinating fine until around 6 pm. He states he has not had much output since then, patient is having lower abdominal pain feels like he has to urinate but unable to. Patient added to scheduled today for evaluation and PVR

## 2025-04-01 NOTE — PATIENT INSTRUCTIONS
Follow-up for a cystoscopy   Call with questions, comments, or concerns. I recommend going to the ED for further evaluation if you develop fever, chills, nausea, vomiting, chest pain, SOB, or calf pain.    The medication list included in this document is our record of what you are currently taking, including any changes that were made at today's visit.  If you find any differences when compared to your medications at home, or have any questions that were not answered at your visit, please contact the office.

## 2025-04-18 RX ORDER — TAMSULOSIN HYDROCHLORIDE 0.4 MG/1
0.4 CAPSULE ORAL 2 TIMES DAILY
Qty: 180 CAPSULE | Refills: 3 | Status: SHIPPED | OUTPATIENT
Start: 2025-04-18 | End: 2026-04-13

## 2025-04-18 NOTE — TELEPHONE ENCOUNTER
Imer Heaton called requesting a refill on the following medications:  Requested Prescriptions     Pending Prescriptions Disp Refills    tamsulosin (FLOMAX) 0.4 MG capsule 180 capsule 3     Sig: Take 1 capsule by mouth 2 times daily     Pharmacy verified:    [facility]/pharmacy #[id number] - [place], [place] - [address] [phone] -  [phone]     Date of last visit: 04/01/2025  Date of next visit (if applicable): 9/17/2025

## 2025-04-29 ENCOUNTER — HOSPITAL ENCOUNTER (OUTPATIENT)
Dept: UROLOGY | Age: 70
Discharge: HOME | End: 2025-04-29
Payer: MEDICARE

## 2025-04-29 VITALS
HEIGHT: 69 IN | SYSTOLIC BLOOD PRESSURE: 116 MMHG | HEART RATE: 74 BPM | BODY MASS INDEX: 26.17 KG/M2 | TEMPERATURE: 97.2 F | WEIGHT: 176.7 LBS | RESPIRATION RATE: 18 BRPM | OXYGEN SATURATION: 95 % | DIASTOLIC BLOOD PRESSURE: 74 MMHG

## 2025-04-29 PROCEDURE — 52000 CYSTOURETHROSCOPY: CPT

## 2025-04-29 NOTE — PROGRESS NOTES
Patient arrived in Urology Center for Cystoscopy  This procedure has been fully reviewed with the patient and written informed consent has been obtained.  1125- Patient arrived with 16Fr coude catheter connected to leg bag draining clear shaggy urine.  Catheter was removed after deflating 10ml balloon.  Patient tolerated well.   1201 Procedure started with Dr. Sharpe  1203 Procedure completed; patient tolerated well.  Dr. Sharpe talked to patient in length about procedure findings.  Patient discharged.    PLAN     Patient opting to leaving catheter out at this time.  Dr. Sharpe is agreeable with this.  Was able to void 100ml post cystoscopy.  Patient was instructed to go to ER if he is unable to urinate within 6-8 hours.    Greenlight.  Pamphlet given.  Office will call to thierry.

## 2025-04-29 NOTE — DISCHARGE INSTRUCTIONS
Discharge instructions: Cystoscopy  You May experience painful urination and see blood in the urine after your procedure.  This should resolve over time.      Pt ok to discharge home in good condition  No heavy lifting, >10 lbs for today  Pt should avoid strenuous activity for today  Pt should walk moderately at home  Pt ok to shower   Pt may resume diet as tolerated  Please call attending physician or hospital  with questions  Call or Present to ED if fever (> 101F), intractable nausea vomiting or pain.    Patient opting to leaving catheter out at this time.  Dr. Sharpe is agreeable with this.  Was able to void 100ml post cystoscopy.  If you are unable to urinate within 6-8 hours go to the ER.      Greenlight.  Pamphlet given.  Office will call to thierry.

## 2025-04-29 NOTE — OP NOTE
Cystoscopy    Operative Note    Patient:  Imer Heaton  MRN: [id number]  YOB: 1955    Date: 04/29/25  Surgeon: NAVDEEP SHARPE MD  Anesthesia: Urojet Local  Indications:     Acute Urinary Retention: S/p R knee surgery on 3/24/2025. Deutsch catheter removed on 3/31/2025. However, pt continues to endorse urinary difficulties, PVR of 817. Will replace deutsch catheter at this time.      Prostatomegaly: Currently managed with flomax 0.4 mg BID and finasteride 5 mg daily.      Elevated PSA: PSA remains elevated at 6.20. Hx of negative biopsies in 2020 and 2022.         Replace deutsch.   Schedule cystoscopy for further evaluation with Dr. Navdeep Sharpe.   Discussed that retention is likely secondary to a combination of anesthesia exposure and prostatomegaly.   Pt very interested in outlet procedure.        Position: Supine  EBL: 0 ml    Findings:   The patient was prepped and draped in the usual sterile fashion.  The flexible cystoscope was advanced through the urethra and into the bladder.  The bladder was thoroughly inspected and the following was noted:    Residual Urine: significant\" \" .  Urine clear, with no obvious infection  Urethra: No abnormalities of the urethra are noted. Urethral dilation was not performed.    Prostate: lateral lobe hypertrophy ++ present, prostate obstructing, intravesical extension of prostate was present (40). There was no previous TURP defect.   Bladder: no tumor noted .   Moderate trabeculation noted.  no bladder diverticulum.  Ureters: Orifices with normal configuration and location.      The cystoscope was removed.  The patient tolerated the procedure well.  80 g prostate   Try to leave deutsch out today- voiding trial  Catheter cystitis+  Discussed pvp (40)- 80 g prostate.     Fail VT- pvp 40  Pass VT- 2 week pvr . Can schedule PVP as needed

## 2025-04-30 ENCOUNTER — HOSPITAL ENCOUNTER (EMERGENCY)
Age: 70
Discharge: HOME | End: 2025-05-01
Payer: MEDICARE

## 2025-04-30 VITALS
DIASTOLIC BLOOD PRESSURE: 70 MMHG | HEART RATE: 78 BPM | RESPIRATION RATE: 16 BRPM | TEMPERATURE: 97.9 F | OXYGEN SATURATION: 96 % | SYSTOLIC BLOOD PRESSURE: 120 MMHG

## 2025-04-30 DIAGNOSIS — N39.0 URINARY TRACT INFECTION ASSOCIATED WITH INDWELLING URETHRAL CATHETER, INITIAL ENCOUNTER: ICD-10-CM

## 2025-04-30 DIAGNOSIS — T83.511A URINARY TRACT INFECTION ASSOCIATED WITH INDWELLING URETHRAL CATHETER, INITIAL ENCOUNTER: ICD-10-CM

## 2025-04-30 DIAGNOSIS — R33.9 URINARY RETENTION: Primary | ICD-10-CM

## 2025-04-30 LAB
BACTERIA URNS QL MICRO: ABNORMAL /HPF
BILIRUB UR QL STRIP.AUTO: NEGATIVE
CASTS #/AREA URNS LPF: ABNORMAL /LPF
CASTS 2: ABNORMAL /LPF
CHARACTER UR: CLEAR
COLOR, UA: YELLOW
CRYSTALS URNS MICRO: ABNORMAL
EPITHELIAL CELLS, UA: ABNORMAL /HPF
GLUCOSE UR QL STRIP.AUTO: NEGATIVE MG/DL
HGB UR QL STRIP.AUTO: NEGATIVE
KETONES UR QL STRIP.AUTO: ABNORMAL
MISCELLANEOUS 2: ABNORMAL
NITRITE UR QL STRIP: NEGATIVE
PH UR STRIP.AUTO: 6 [PH] (ref 5–9)
PROT UR STRIP.AUTO-MCNC: NEGATIVE MG/DL
RBC URINE: ABNORMAL /HPF
RENAL EPI CELLS #/AREA URNS HPF: ABNORMAL /[HPF]
SP GR UR REFRACT.AUTO: 1.01 (ref 1–1.03)
UROBILINOGEN, URINE: 0.2 EU/DL (ref 0–1)
WBC #/AREA URNS HPF: ABNORMAL /HPF
WBC #/AREA URNS HPF: ABNORMAL /[HPF]
YEAST LIKE FUNGI URNS QL MICRO: ABNORMAL

## 2025-04-30 PROCEDURE — 81001 URINALYSIS AUTO W/SCOPE: CPT

## 2025-04-30 PROCEDURE — 99283 EMERGENCY DEPT VISIT LOW MDM: CPT

## 2025-04-30 PROCEDURE — 87086 URINE CULTURE/COLONY COUNT: CPT

## 2025-04-30 ASSESSMENT — PAIN - FUNCTIONAL ASSESSMENT: PAIN_FUNCTIONAL_ASSESSMENT: NONE - DENIES PAIN

## 2025-05-01 RX ORDER — CEPHALEXIN 500 MG/1
500 CAPSULE ORAL 3 TIMES DAILY
Qty: 15 CAPSULE | Refills: 0 | Status: SHIPPED | OUTPATIENT
Start: 2025-05-01 | End: 2025-05-06

## 2025-05-01 NOTE — ED PROVIDER NOTES
course)          My Independent interpretations:     EKG:          Imaging:      Labs:      mild UTI.                     Decision Rules/Clinical Scores utilized:                        Previous visit summary and patient history available on EMR and was reviewed.     History obtained from chart review and the patient.     Pertinent previous records reviewed:  previous notes, admissions and hospitalizations .    Code Status: Not addressed at time of initial evaluation             See Formal Diagnostic Results above for the lab and radiology tests and orders.         3)  Treatment and Disposition         ED Reassessment/Response to interventions:  stable     NA         Case discussed with consulting clinician/attending physician:  None/None         Shared Decision-Making was performed and disposition discussed with the       Patient/Family and questions answered          Social determinants of health impacting treatment or disposition:     4) MIPS  N/A                    Medical Decision Making  The patient was seen in the ER for urinary retention.  Deutsch is placed with relief from symptoms.  UA shows signs of infection.  Will start on oral abx.  Discharged home with deutsch in place with instructions to call urology for follow up.  Patient is agreeable.        Vitals Reviewed:    Vitals:    04/30/25 2220 04/30/25 2320   BP: (!) 145/91 120/70   Pulse: 78    Resp: 16    Temp: 97.9 °F (36.6 °C)    TempSrc: Oral    SpO2: 98% 96%       The patient was seen and examined. Appropriate diagnostic testing was performed and results reviewed with the patient.         The results of pertinent diagnostic studies and exam findings were discussed. The patient’s provisional diagnosis and plan of care were discussed with the patient and present family who expressed understanding and agreement with the POC. Any medications were reviewed and indications and risks of medications were discussed with the patient /family present. Strict

## 2025-05-01 NOTE — ED NOTES
Per verbal from Michelle CASTELLANOS, indwelling catheter placed at this time. Pt stating he has no complaint at this time and all discomfort gone. Patient resting in bed. Respirations easy and unlabored. No distress noted. Call light within reach.'

## 2025-05-01 NOTE — ED TRIAGE NOTES
Pt to ED from home with c/o urinary retention. Pt states he has had a indwelling catheter since March 25, and got it removed yesterday when he had surgery, which they wanted to see if he could urinate on his own. Pt states he has been \"trickling\" urine, however, his bladder feels full and he has some discomfort. Patient resting in bed. Respirations easy and unlabored. No distress noted. Call light within reach.

## 2025-05-02 LAB — BACTERIA UR CULT: NORMAL

## 2025-05-03 NOTE — TELEPHONE ENCOUNTER
----- Message from Gus Farmer MD sent at 2/28/2023  6:19 AM EST -----  Labs stable and  chol at 188 and bad chol 127 so like to see lower  at 100 for ldl so diet and  exercise Rn reviewed discharge instructions with pt and .  No questions.  Pt discharged with  to their private home.

## 2025-05-05 ENCOUNTER — TELEPHONE (OUTPATIENT)
Dept: UROLOGY | Age: 70
End: 2025-05-05

## 2025-05-05 DIAGNOSIS — N13.8 BPH WITH URINARY OBSTRUCTION: ICD-10-CM

## 2025-05-05 DIAGNOSIS — N40.1 BPH WITH URINARY OBSTRUCTION: ICD-10-CM

## 2025-05-05 NOTE — TELEPHONE ENCOUNTER
Patient scheduled for a CYSTOSCOPY GREENLIGHT PHOTO VAPORIZATION OF PROSTATE  with DR HARTLEY on 6/3/2025. We are asking for clearance and direction on holding 325mg Aspirin.

## 2025-05-05 NOTE — TELEPHONE ENCOUNTER
Patient is scheduled for surgery with  on 6/3/2025. Surgery consent to be done on arrival. Dr. VELAZQUEZ to clear.  NO TESTING NEEDED. Surgery instructions mailed to the patient.     Patient informed an adult over the age of 18 must be with them at the time of surgery and upon discharge      [facility] CONF#[phone] PER ADAM

## 2025-05-05 NOTE — TELEPHONE ENCOUNTER
DO NOT TAKE  FISH OIL, MOBIC, IBUPROFEN, MOTRIN-LIKE DRUGS AND ANY MULTIVITAMINS OR OVER THE COUNTER SUPPLEMENTS 14 DAYS PRIOR TO SURGERY.    HOLD ASPIRIN 5 DAYS PRIOR TO SURGERY    MUST HAVE AN ADULT OVER THE AGE OF 18 WITH YOU AT THE TIME OF THE DISCHARGE         Imer Heaton 1955     Surgical Physician: Dr. Sharpe      You have been scheduled for the procedure marked below:      Surgery: CYSTOSCOPY GREENLIGHT PHOTO VAPORIZATION OF PROSTATE         Date: 6/3/2025     Anesthesia:  General     Place of Service: [facility] --Second Floor Same Day Surgery         Arrive to same day surgery at:  CALL SURGERY DESK [phone], THE DAY PRIOR TO SURGERY BETWEEN 8AM-4PM, FOR ARRIVAL TIME (IF SURGERY IS ON A MONDAY, CALL THE FRIDAY PRIOR)       INSTRUCTIONS AS MARKED BELOW:    1.  DO NOT eat or drink anything after midnight before surgery.   2.  We prefer you shower or bathe with an antibacterial soap (Dial) the morning of surgery.  3  Please bring a current medication list, photo ID and insurance card(s) with you  4. Okay to take Tylenol  5. Take blood pressure or heart medication as directed, if taken in the morning take with a small sip of water  6.The office will call you in 1-2 days after your procedure to schedule a follow up.          Date: 5/5/2025

## 2025-05-07 ENCOUNTER — OFFICE VISIT (OUTPATIENT)
Dept: CARDIOLOGY CLINIC | Age: 70
End: 2025-05-07
Payer: MEDICARE

## 2025-05-07 VITALS
HEIGHT: 69 IN | SYSTOLIC BLOOD PRESSURE: 116 MMHG | DIASTOLIC BLOOD PRESSURE: 76 MMHG | HEART RATE: 72 BPM | BODY MASS INDEX: 25.86 KG/M2 | WEIGHT: 174.6 LBS

## 2025-05-07 DIAGNOSIS — I49.1 PAC (PREMATURE ATRIAL CONTRACTION): Primary | ICD-10-CM

## 2025-05-07 PROCEDURE — 1123F ACP DISCUSS/DSCN MKR DOCD: CPT | Performed by: PHYSICIAN ASSISTANT

## 2025-05-07 PROCEDURE — G8427 DOCREV CUR MEDS BY ELIG CLIN: HCPCS | Performed by: PHYSICIAN ASSISTANT

## 2025-05-07 PROCEDURE — 1036F TOBACCO NON-USER: CPT | Performed by: PHYSICIAN ASSISTANT

## 2025-05-07 PROCEDURE — 3017F COLORECTAL CA SCREEN DOC REV: CPT | Performed by: PHYSICIAN ASSISTANT

## 2025-05-07 PROCEDURE — 1159F MED LIST DOCD IN RCRD: CPT | Performed by: PHYSICIAN ASSISTANT

## 2025-05-07 PROCEDURE — G8417 CALC BMI ABV UP PARAM F/U: HCPCS | Performed by: PHYSICIAN ASSISTANT

## 2025-05-07 PROCEDURE — 99214 OFFICE O/P EST MOD 30 MIN: CPT | Performed by: PHYSICIAN ASSISTANT

## 2025-05-07 NOTE — PROGRESS NOTES
[facility] [place] [facility]  [facility] CARDIOLOG[facility]  [address]  [address]  [facility] [address]  Dept: [phone]  Dept Fax: [phone]  Loc: [phone]    Chief Complaint   Patient presents with    1 Year Follow Up     No cardiac c/o       History of Present Illness  The patient is a 70-year-old gentleman with a history of intermittent palpitations with PACs and short runs of atrial tachycardia, currently on flecainide. He presents for a 1-year follow-up.    He reports no significant changes in his condition since his last visit. His symptoms initially manifested during a colonoscopy procedure, prompting him to seek medical attention from his primary care physician. Subsequently, he was referred to Dr Arthur who initiated pharmacological treatment. His most recent diagnostic test yielded satisfactory results. He does not experience any symptoms that would impede his daily activities. He maintains an active lifestyle, engaging in regular exercise and walking. He recalls that prior to his colonoscopy in 2020, he had not experienced any abnormalities on his EKGs during his annual physical examinations. He is not experiencing any shortness of breath or chest pain. He is currently on flecainide 50 mg twice daily.      SOCIAL HISTORY  Exercise: Walks and exercises regularly       Cardiologist:  Dr. Arthur        General:   No fever, no chills, No fatigue or weight loss  Pulmonary:    No dyspnea, no wheezing  Cardiac:    Denies recent chest pain   GI:     No nausea or vomiting, no abdominal pain  Neuro:    No dizziness or light headedness  Musculoskeletal:  No recent active issues  Extremities:   No edema, good peripheral pulses      Past Medical History:   Diagnosis Date    BPH (benign prostatic hyperplasia)     Esophagitis, acute 12/2020    [facility]  egd    GERD (gastroesophageal reflux disease)     Headache(784.0)     Schatzki's ring     Varicose veins        No Known

## 2025-05-11 ENCOUNTER — HOSPITAL ENCOUNTER (EMERGENCY)
Age: 70
Discharge: HOME | End: 2025-05-11
Attending: EMERGENCY MEDICINE
Payer: MEDICARE

## 2025-05-11 VITALS
HEIGHT: 69 IN | HEART RATE: 82 BPM | RESPIRATION RATE: 18 BRPM | WEIGHT: 174 LBS | OXYGEN SATURATION: 97 % | DIASTOLIC BLOOD PRESSURE: 73 MMHG | SYSTOLIC BLOOD PRESSURE: 140 MMHG | TEMPERATURE: 99 F | BODY MASS INDEX: 25.77 KG/M2

## 2025-05-11 DIAGNOSIS — R33.8 ACUTE URINARY RETENTION: Primary | ICD-10-CM

## 2025-05-11 LAB
AMORPH SED URNS QL MICRO: ABNORMAL
BACTERIA: ABNORMAL
BILIRUB UR QL STRIP: NEGATIVE
CASTS #/AREA URNS LPF: ABNORMAL /LPF
CASTS #/AREA URNS LPF: ABNORMAL /LPF
CHARACTER UR: ABNORMAL
CHARCOAL URNS QL MICRO: ABNORMAL
COLOR UR: YELLOW
CRYSTALS URNS QL MICRO: ABNORMAL
CRYSTALS URNS QL MICRO: ABNORMAL
EPITHELIAL CELLS, UA: ABNORMAL /HPF
GLUCOSE UR QL STRIP.AUTO: NEGATIVE MG/DL
HGB UR QL STRIP.AUTO: ABNORMAL
KETONES UR QL STRIP.AUTO: ABNORMAL
LEUKOCYTE ESTERASE UR QL STRIP.AUTO: ABNORMAL
MISCELLANEOUS LAB TEST RESULT: ABNORMAL
MUCOUS THREADS URNS QL MICRO: ABNORMAL
NITRITE UR QL STRIP.AUTO: NEGATIVE
PH UR STRIP.AUTO: 5.5 [PH] (ref 5–9)
PROT UR STRIP.AUTO-MCNC: 30 MG/DL
RBC #/AREA URNS HPF: > 100 /HPF
RENAL EPI CELLS #/AREA URNS HPF: ABNORMAL /[HPF]
SP GR UR REFRACT.AUTO: 1.02 (ref 1–1.03)
UROBILINOGEN UR QL STRIP.AUTO: 0.2 EU/DL (ref 0–1)
WBC #/AREA URNS HPF: > 200 /HPF
YEAST LIKE FUNGI URNS QL MICRO: ABNORMAL

## 2025-05-11 PROCEDURE — 51798 US URINE CAPACITY MEASURE: CPT

## 2025-05-11 PROCEDURE — 81001 URINALYSIS AUTO W/SCOPE: CPT

## 2025-05-11 PROCEDURE — 99283 EMERGENCY DEPT VISIT LOW MDM: CPT

## 2025-05-11 ASSESSMENT — PAIN - FUNCTIONAL ASSESSMENT: PAIN_FUNCTIONAL_ASSESSMENT: NONE - DENIES PAIN

## 2025-05-11 NOTE — ED NOTES
Urinary catheter removed and patient given cup of water. Urinary bladder scanner 282 ml. Bloody discharge noted out of penis. Patient will continue to urinate himself at this time prior to insertion of new urinary catheter.

## 2025-05-11 NOTE — ED TRIAGE NOTES
Pt to the ED with c/o  deutsch catheter blockage. Pt reports he last noticed drainage around noon. Pt reports he feels the urge to go but is having no output. Pt denies any pain at this time.

## 2025-05-12 ENCOUNTER — TELEPHONE (OUTPATIENT)
Dept: UROLOGY | Age: 70
End: 2025-05-12

## 2025-05-12 NOTE — TELEPHONE ENCOUNTER
Pt contacted the on call provider with concerns that his catheter was not draining. Recommended ED evaluation for possible irrigation vs replacement.

## 2025-05-12 NOTE — DISCHARGE INSTRUCTIONS
We will call you with the results of urinalysis.    Return if you have any new or changing symptoms such as decreased urine output, flank pain, fever, abdominal pain or you have any other concerns.

## 2025-05-13 NOTE — ED PROVIDER NOTES
[facility]  EMERGENCY DEPARTMENT ENCOUNTER        Pt Name: Imer Heaton  MRN: [id number]  Birthdate 1955  Date of evaluation: 5/11/2025  Emergency Physician: Addi Muir DO    Imer Heaton is a 70 y.o. male who presents with decreased Urine out put. Report ho prostate hypertrophy. Scheduled for a Green light procedure. He has had an indwelling catheter since his knee replacement. He reports the catheter was last changed 2 weeks ago. He denies fever. Denies flank pain. No nausea. No vomiting. The context is that the symptoms started spontaneously, without any known precipitants.     REVIEW OF SYSTEMS   GI: See history of present illness   Cardiac: No chest pain or syncope   Pulmonary: No difficulty breathing or new cough   General: No fevers   : No hematuria or dysuria   See HPI for further details.   All other review of systems are reviewed and are otherwise negative.     PAST MEDICAL & SURGICAL HISTORY   Past Medical History:   Diagnosis Date    BPH (benign prostatic hyperplasia)     Esophagitis, acute 12/2020    [facility]  egd    GERD (gastroesophageal reflux disease)     Headache(784.0)     Schatzki's ring     Varicose veins       Past Surgical History:   Procedure Laterality Date    COLONOSCOPY  09/2023 2028      \[facility]    KNEE ARTHROSCOPY Left 1998    PROSTATE BIOPSY  08/2022    (-)    PROSTATE BIOPSY  07/2022    Dr Sharpe @ [facility]    TOTAL KNEE ARTHROPLASTY Right 03/24/2025    ULTRASOUND PROSTATE/TRANSRECTAL N/A 06/22/2020    TRANSRECTAL ULTRASOUND WITH PROSTATE  BIOPSY performed by Shayne Larsen MD at [facility] OR    UPPER GASTROINTESTINAL ENDOSCOPY N/A 11/2020    done on  and   [facility]        CURRENT MEDICATIONS   Current Outpatient Rx   Medication Sig Dispense Refill    tamsulosin (FLOMAX) 0.4 MG capsule Take 1 capsule by mouth 2 times daily 180 capsule 3    finasteride (PROSCAR) 5 MG tablet Take 1 tablet by mouth daily 90 tablet 3    flecainide

## 2025-05-16 ENCOUNTER — CLINICAL SUPPORT (OUTPATIENT)
Dept: UROLOGY | Age: 70
End: 2025-05-16

## 2025-05-16 ENCOUNTER — OFFICE VISIT (OUTPATIENT)
Dept: FAMILY MEDICINE CLINIC | Age: 70
End: 2025-05-16

## 2025-05-16 VITALS
HEART RATE: 72 BPM | RESPIRATION RATE: 16 BRPM | WEIGHT: 173.38 LBS | BODY MASS INDEX: 25.68 KG/M2 | SYSTOLIC BLOOD PRESSURE: 116 MMHG | HEIGHT: 69 IN | DIASTOLIC BLOOD PRESSURE: 68 MMHG

## 2025-05-16 DIAGNOSIS — N13.8 BPH WITH URINARY OBSTRUCTION: ICD-10-CM

## 2025-05-16 DIAGNOSIS — D62 ACUTE BLOOD LOSS ANEMIA: ICD-10-CM

## 2025-05-16 DIAGNOSIS — R33.8 ACUTE URINARY RETENTION: Primary | ICD-10-CM

## 2025-05-16 DIAGNOSIS — I49.1 PAC (PREMATURE ATRIAL CONTRACTION): Primary | ICD-10-CM

## 2025-05-16 DIAGNOSIS — Z96.651 S/P TOTAL KNEE ARTHROPLASTY, RIGHT: ICD-10-CM

## 2025-05-16 DIAGNOSIS — N40.0 HYPERTROPHY OF PROSTATE WITHOUT URINARY OBSTRUCTION: ICD-10-CM

## 2025-05-16 DIAGNOSIS — N40.1 BPH WITH URINARY OBSTRUCTION: ICD-10-CM

## 2025-05-16 LAB — HGB, POC: 14.5 G/DL

## 2025-05-16 PROCEDURE — NBSRV NON-BILLABLE SERVICE

## 2025-05-16 ASSESSMENT — ENCOUNTER SYMPTOMS
SORE THROAT: 0
COUGH: 0
BLOOD IN STOOL: 0
SHORTNESS OF BREATH: 0
BACK PAIN: 0
EYE PAIN: 0
NAUSEA: 0
ABDOMINAL PAIN: 0
TROUBLE SWALLOWING: 0
CONSTIPATION: 0
CHEST TIGHTNESS: 0

## 2025-05-16 NOTE — PROGRESS NOTES
Subjective   Patient ID: Imer Heaton is a 70 y.o. male.        Bph     stable         Right  knee   stabl e         PAC   and  stable               Past Medical History:   Diagnosis Date    BPH (benign prostatic hyperplasia)     Esophagitis, acute 12/2020    [facility]  egd    GERD (gastroesophageal reflux disease)     Headache(784.0)     Schatzki's ring     Varicose veins      Past Surgical History:   Procedure Laterality Date    COLONOSCOPY  09/2023 2028      \[facility]    KNEE ARTHROSCOPY Left 1998    PROSTATE BIOPSY  08/2022    (-)    PROSTATE BIOPSY  07/2022    Dr Sharpe @ [facility]    TOTAL KNEE ARTHROPLASTY Right 03/24/2025    Dr Starr @ [facility]    ULTRASOUND PROSTATE/TRANSRECTAL N/A 06/22/2020    TRANSRECTAL ULTRASOUND WITH PROSTATE  BIOPSY performed by Shayne Larsen MD at [facility] OR    UPPER GASTROINTESTINAL ENDOSCOPY N/A 11/2020    done on  and   [facility]     Social History     Socioeconomic History    Marital status:      Spouse name: Not on file    Number of children: Not on file    Years of education: Not on file    Highest education level: Not on file   Occupational History    Not on file   Tobacco Use    Smoking status: Never     Passive exposure: Never    Smokeless tobacco: Never   Substance and Sexual Activity    Alcohol use: Not Currently     Comment: Moderate on weekends but none the last month    Drug use: No    Sexual activity: Yes     Partners: Female   Other Topics Concern    Not on file   Social History Narrative    Not on file     Social Drivers of Health     Financial Resource Strain: Low Risk  (3/4/2024)    Overall Financial Resource Strain (CARDIA)     Difficulty of Paying Living Expenses: Not hard at all   Food Insecurity: No Food Insecurity (3/10/2025)    Hunger Vital Sign     Worried About Running Out of Food in the Last Year: Never true     Ran Out of Food in the Last Year: Never true   Transportation Needs: No Transportation Needs (3/10/2025)    PRAPARE -

## 2025-05-16 NOTE — PROGRESS NOTES
Patient came in due to catheter pulling apart at night. Explained to patient that it may be better to use the overnight bag at night vs.the leg bag. Statlock also changed due to it falling off.     Patient has catheter due to urinary retention

## 2025-05-23 ENCOUNTER — PREP FOR PROCEDURE (OUTPATIENT)
Dept: UROLOGY | Age: 70
End: 2025-05-23

## 2025-05-23 RX ORDER — TAMSULOSIN HYDROCHLORIDE 0.4 MG/1
CAPSULE ORAL DAILY
Qty: 90 CAPSULE | Refills: 3 | Status: SHIPPED | OUTPATIENT
Start: 2025-05-23

## 2025-05-23 NOTE — TELEPHONE ENCOUNTER
Imer Heaton called requesting a refill on the following medications:  Requested Prescriptions     Pending Prescriptions Disp Refills    tamsulosin (FLOMAX) 0.4 MG capsule [Pharmacy Med Name: TAMSULOSIN HCL 0.4 MG CAPSULE] 90 capsule 3     Sig: TAKE 1 CAPSULE BY MOUTH EVERY DAY     Pharmacy verified:  .[email or web address]      Date of last visit: 04/01/2025  Date of next visit (if applicable): 9/17/2025

## 2025-05-23 NOTE — TELEPHONE ENCOUNTER
Script sent    The patient should go to the ED should they develop fever, chills, nausea, vomiting, significant gross hematuria, chest pain, SOB, calf pain, feelings of incomplete emptying, or should they otherwise feel they need evaluated.

## 2025-05-28 RX ORDER — SODIUM CHLORIDE 0.9 % (FLUSH) 0.9 %
5-40 SYRINGE (ML) INJECTION PRN
Status: CANCELLED | OUTPATIENT
Start: 2025-05-28

## 2025-05-28 RX ORDER — SODIUM CHLORIDE 0.9 % (FLUSH) 0.9 %
5-40 SYRINGE (ML) INJECTION EVERY 12 HOURS SCHEDULED
Status: CANCELLED | OUTPATIENT
Start: 2025-05-28

## 2025-05-28 RX ORDER — SODIUM CHLORIDE 9 MG/ML
INJECTION, SOLUTION INTRAVENOUS PRN
Status: CANCELLED | OUTPATIENT
Start: 2025-05-28

## 2025-05-28 NOTE — PROGRESS NOTES
PAT call attempted, patient unavailable, left message to please call us back at your earliest convenience; [phone]

## 2025-05-28 NOTE — PROGRESS NOTES
PAT Call Date:  5/28   Surgery Date: 6/3    Surgeon: Annemarie   Surgery: greenlight    Any Isolation Precautions? No      Type of Isolation Precaution: Not Applicable   Is patient from a nursing home? No  Name of Nursing Home:   Any equipment assist needed for moving patient? No   Type of Equipment: Not Applicable  Patient last weight: 173 lb     Hard Copy on Chart  In EPIC Pending/Notes   Consent -   Within 30 days; signed, dated & timed by patient and physician     [x] On Arrival     [] Blood      [] DNR   H&P -   Within 30 days    [] Physician To Do     Clearance -        5/23  []Medical     [x]Cardiac Charis-cleared     [] Pulmonary    Orders -   Signed and Dated    5/28  [] Physician To Do    Labs -   Within 3 months   3/4          5/11  [x] CBC -ok   [x] BMP-ok   [x] GFR-ok   [] INR    [] PTT    [x] Urine -ok   [] Liver Enzymes    [] MRSA Nasal      Others:     Radiology Studies -   Within 1 year  3/4  [x] Chest X-Ray-ok   [] MRI    [] CT    [] Vascular   [] US     Pulmonary -     [] MEAGAN   [] CPAP     Cardiac Workup -   Stress Test, Echo, Cath within 18 months  3/4      11/6/23    4/29/24  [x] EKG-ok      [] Cath                 [] Stress Test                      [x] Echo/JHONY 60%   [] CABG   [x] Holter Monitor      [] Pacemaker/ICD        Brand:        Where does patient have checked:         Last check:         Rep Notified:

## 2025-05-29 NOTE — FLOWSHEET NOTE
Follow all instructions given by your physician  If Urology case Call [phone] the weekday before procedure to find out Arrival Time.  Do not eat or drink anything after midnight prior to surgery(includes water, chewing gum, mints and ice chips)  Sips of water am of surgery with allowed medications  May brush teeth   Do not smoke or chew tobacco, drink alcoholic beverages or use any illicit drugs for 24 hours prior to surgery  Bring insurance info and photo ID  Bring pertinent paperwork with you from Doctor or surgeons's office  Wear clean comfortable, loose-fitting clothing  No make-up, nail polish, jewelry, piercings, or contact lenses to be worn day of surgery  No glue on dentures morning of surgery; you will be asked to remove them for surgery. Case for glasses.  Shower the night before and the morning of surgery with cleansing soap provided or a liquid antibacterial soap, dry with new fresh clean towel after each shower, no lotions, creams or powder.  Clean sheets and pillowcase on bed night before surgery  Bring rescue inhalers with you  Bring CPAP/BIPAP machine if you have one ( you may be charged if one is needed in recovery room ) no pacemaker no     If patient is a Hip or Knee Replacement, HOOS or KOOS is reviewed and documented.     Do you have a DNR? no  Please Bring Healthcare Directive or Healthcare Power of  in so we can scan it into your chart.    Report to [facility] on 2nd floor  If you arrive without a responsible adult to drive you home your surgery may be cancelled.   You should also have a responsible adult with you overnight.    If you would become ill prior to surgery, please call the surgeon right away.  We request that you limit to 2 visitors in pre-op area  Masks are recommended but not required, new masks at entrance desk  Call [phone] for any questions    Our pharmacy has a Meds to Beds program where they will deliver any new prescriptions you may have to your room before

## 2025-05-30 RX ORDER — FLECAINIDE ACETATE 50 MG/1
50 TABLET ORAL 2 TIMES DAILY
Qty: 60 TABLET | Refills: 1 | Status: SHIPPED | OUTPATIENT
Start: 2025-05-30

## 2025-06-03 ENCOUNTER — HOSPITAL ENCOUNTER (OUTPATIENT)
Age: 70
Setting detail: OUTPATIENT SURGERY
Discharge: HOME | End: 2025-06-03
Attending: UROLOGY | Admitting: UROLOGY
Payer: MEDICARE

## 2025-06-03 ENCOUNTER — TELEPHONE (OUTPATIENT)
Dept: UROLOGY | Age: 70
End: 2025-06-03

## 2025-06-03 ENCOUNTER — ANESTHESIA EVENT (OUTPATIENT)
Dept: OPERATING ROOM | Age: 70
End: 2025-06-03
Payer: MEDICARE

## 2025-06-03 ENCOUNTER — ANESTHESIA (OUTPATIENT)
Dept: OPERATING ROOM | Age: 70
End: 2025-06-03
Payer: MEDICARE

## 2025-06-03 VITALS
DIASTOLIC BLOOD PRESSURE: 80 MMHG | RESPIRATION RATE: 16 BRPM | WEIGHT: 172 LBS | OXYGEN SATURATION: 99 % | HEART RATE: 70 BPM | HEIGHT: 69 IN | TEMPERATURE: 97 F | SYSTOLIC BLOOD PRESSURE: 138 MMHG | BODY MASS INDEX: 25.48 KG/M2

## 2025-06-03 DIAGNOSIS — G89.18 ACUTE POSTOPERATIVE PAIN: ICD-10-CM

## 2025-06-03 DIAGNOSIS — N40.1 BPH WITH URINARY OBSTRUCTION: Primary | ICD-10-CM

## 2025-06-03 DIAGNOSIS — N13.8 BPH WITH URINARY OBSTRUCTION: Primary | ICD-10-CM

## 2025-06-03 PROCEDURE — 7100000001 HC PACU RECOVERY - ADDTL 15 MIN: Performed by: UROLOGY

## 2025-06-03 PROCEDURE — 6360000002 HC RX W HCPCS: Performed by: UROLOGY

## 2025-06-03 PROCEDURE — 2709999900 HC NON-CHARGEABLE SUPPLY: Performed by: UROLOGY

## 2025-06-03 PROCEDURE — 2720000010 HC SURG SUPPLY STERILE: Performed by: UROLOGY

## 2025-06-03 PROCEDURE — 2500000003 HC RX 250 WO HCPCS: Performed by: NURSE ANESTHETIST, CERTIFIED REGISTERED

## 2025-06-03 PROCEDURE — 6360000002 HC RX W HCPCS: Performed by: NURSE ANESTHETIST, CERTIFIED REGISTERED

## 2025-06-03 PROCEDURE — 7100000011 HC PHASE II RECOVERY - ADDTL 15 MIN: Performed by: UROLOGY

## 2025-06-03 PROCEDURE — 3700000001 HC ADD 15 MINUTES (ANESTHESIA): Performed by: UROLOGY

## 2025-06-03 PROCEDURE — 3700000000 HC ANESTHESIA ATTENDED CARE: Performed by: UROLOGY

## 2025-06-03 PROCEDURE — 7100000010 HC PHASE II RECOVERY - FIRST 15 MIN: Performed by: UROLOGY

## 2025-06-03 PROCEDURE — 7100000000 HC PACU RECOVERY - FIRST 15 MIN: Performed by: UROLOGY

## 2025-06-03 PROCEDURE — 2580000003 HC RX 258: Performed by: UROLOGY

## 2025-06-03 PROCEDURE — 2500000003 HC RX 250 WO HCPCS: Performed by: UROLOGY

## 2025-06-03 PROCEDURE — 3600000003 HC SURGERY LEVEL 3 BASE: Performed by: UROLOGY

## 2025-06-03 PROCEDURE — 3600000013 HC SURGERY LEVEL 3 ADDTL 15MIN: Performed by: UROLOGY

## 2025-06-03 RX ORDER — CIPROFLOXACIN 500 MG/1
500 TABLET, FILM COATED ORAL 2 TIMES DAILY
Qty: 10 TABLET | Refills: 0 | Status: SHIPPED | OUTPATIENT
Start: 2025-06-03 | End: 2025-06-04

## 2025-06-03 RX ORDER — SODIUM CHLORIDE 0.9 % (FLUSH) 0.9 %
5-40 SYRINGE (ML) INJECTION PRN
Status: DISCONTINUED | OUTPATIENT
Start: 2025-06-03 | End: 2025-06-03 | Stop reason: HOSPADM

## 2025-06-03 RX ORDER — PHENAZOPYRIDINE HYDROCHLORIDE 200 MG/1
200 TABLET, FILM COATED ORAL
Status: DISCONTINUED | OUTPATIENT
Start: 2025-06-03 | End: 2025-06-03 | Stop reason: HOSPADM

## 2025-06-03 RX ORDER — EPHEDRINE SULFATE/0.9% NACL/PF 25 MG/5 ML
SYRINGE (ML) INTRAVENOUS
Status: DISCONTINUED | OUTPATIENT
Start: 2025-06-03 | End: 2025-06-03 | Stop reason: SDUPTHER

## 2025-06-03 RX ORDER — SODIUM CHLORIDE 9 MG/ML
INJECTION, SOLUTION INTRAVENOUS PRN
Status: DISCONTINUED | OUTPATIENT
Start: 2025-06-03 | End: 2025-06-03 | Stop reason: HOSPADM

## 2025-06-03 RX ORDER — PROPOFOL 10 MG/ML
INJECTION, EMULSION INTRAVENOUS
Status: DISCONTINUED | OUTPATIENT
Start: 2025-06-03 | End: 2025-06-03 | Stop reason: SDUPTHER

## 2025-06-03 RX ORDER — LIDOCAINE HYDROCHLORIDE 20 MG/ML
INJECTION, SOLUTION INTRAVENOUS
Status: DISCONTINUED | OUTPATIENT
Start: 2025-06-03 | End: 2025-06-03 | Stop reason: SDUPTHER

## 2025-06-03 RX ORDER — DEXAMETHASONE SODIUM PHOSPHATE 4 MG/ML
INJECTION, SOLUTION INTRA-ARTICULAR; INTRALESIONAL; INTRAMUSCULAR; INTRAVENOUS; SOFT TISSUE
Status: DISCONTINUED | OUTPATIENT
Start: 2025-06-03 | End: 2025-06-03 | Stop reason: SDUPTHER

## 2025-06-03 RX ORDER — HYDROCODONE BITARTRATE AND ACETAMINOPHEN 5; 325 MG/1; MG/1
1 TABLET ORAL EVERY 6 HOURS PRN
Qty: 10 TABLET | Refills: 0 | Status: SHIPPED | OUTPATIENT
Start: 2025-06-03 | End: 2025-06-06

## 2025-06-03 RX ORDER — FENTANYL CITRATE 50 UG/ML
INJECTION, SOLUTION INTRAMUSCULAR; INTRAVENOUS
Status: DISCONTINUED | OUTPATIENT
Start: 2025-06-03 | End: 2025-06-03 | Stop reason: SDUPTHER

## 2025-06-03 RX ORDER — OXYBUTYNIN CHLORIDE 5 MG/1
5 TABLET ORAL
Status: DISCONTINUED | OUTPATIENT
Start: 2025-06-03 | End: 2025-06-03 | Stop reason: HOSPADM

## 2025-06-03 RX ORDER — SODIUM CHLORIDE 0.9 % (FLUSH) 0.9 %
5-40 SYRINGE (ML) INJECTION EVERY 12 HOURS SCHEDULED
Status: DISCONTINUED | OUTPATIENT
Start: 2025-06-03 | End: 2025-06-03 | Stop reason: HOSPADM

## 2025-06-03 RX ORDER — ONDANSETRON 2 MG/ML
INJECTION INTRAMUSCULAR; INTRAVENOUS
Status: DISCONTINUED | OUTPATIENT
Start: 2025-06-03 | End: 2025-06-03 | Stop reason: SDUPTHER

## 2025-06-03 RX ADMIN — DEXAMETHASONE SODIUM PHOSPHATE 8 MG: 4 INJECTION, SOLUTION INTRAMUSCULAR; INTRAVENOUS at 12:16

## 2025-06-03 RX ADMIN — EPHEDRINE SULFATE 10 MG: 5 INJECTION INTRAVENOUS at 12:16

## 2025-06-03 RX ADMIN — Medication 100 MG: at 12:05

## 2025-06-03 RX ADMIN — FENTANYL CITRATE 100 MCG: 50 INJECTION, SOLUTION INTRAMUSCULAR; INTRAVENOUS at 12:05

## 2025-06-03 RX ADMIN — ONDANSETRON 4 MG: 2 INJECTION, SOLUTION INTRAMUSCULAR; INTRAVENOUS at 12:16

## 2025-06-03 RX ADMIN — PROPOFOL 170 MG: 10 INJECTION, EMULSION INTRAVENOUS at 12:05

## 2025-06-03 RX ADMIN — SODIUM CHLORIDE: 0.9 INJECTION, SOLUTION INTRAVENOUS at 10:33

## 2025-06-03 RX ADMIN — WATER 2000 MG: 1 INJECTION INTRAMUSCULAR; INTRAVENOUS; SUBCUTANEOUS at 12:00

## 2025-06-03 ASSESSMENT — PAIN - FUNCTIONAL ASSESSMENT: PAIN_FUNCTIONAL_ASSESSMENT: 0-10

## 2025-06-03 ASSESSMENT — PAIN SCALES - GENERAL: PAINLEVEL_OUTOF10: 0

## 2025-06-03 NOTE — DISCHARGE INSTRUCTIONS
over rough ground for 4 weeks or until cleared by your surgeon as this may prompt increased bleeding.       Normal Postoperative Instructions   -No driving on day of surgery and while on narcotic pain medication    -No heavy lifting (>20 pounds), pushing, pulling or straining for 6 weeks from surgery or until cleared by your surgeon.    -Walk moderately at home    -Ok to shower?    -Resume diet as tolerated    -Drink at least 64 ounces of fluid per day.   -Take prescriptions as directed        Please call the office or hospital  with questions    Call or Present to ED if fever (> 101F), intractable nausea vomiting, or pain.        Office will facilitate scheduling of follow-up appointment.??       Preciado catheter to be removed in office tomorrow

## 2025-06-03 NOTE — PROGRESS NOTES
Pt has met discharge criteria and states he is ready for discharge to home. IV removed, gauze and tape applied. Dressed in own clothes and personal belongings gathered. Discharge instructions (with opioid medication education information) given to pt and family; pt and family verbalized understanding of discharge instructions, prescriptions and follow up appointments. Pt transported to discharge lobby by [facility] staff.

## 2025-06-03 NOTE — H&P
BONI SHARPE MD  History and Physical    Patient:  Imer Heaton  MRN: [id number]  YOB: 1955    HISTORY OF PRESENT ILLNESS:     The patient is a 70 y.o. male who presents with bph. Here for procedure.    Patient's old records, notes and chart reviewed and summarized above.     BONI SHARPE MD independently reviewed the images and verified the radiology reports from:    No results found.      Past Medical History:    Past Medical History:   Diagnosis Date    BPH (benign prostatic hyperplasia)     Esophagitis, acute 12/2020    [facility]  egd    GERD (gastroesophageal reflux disease)     Headache(784.0)     Premature atrial complex 2023    Schatzki's ring     narrowing in the esophagus, specifically at the junction where it meets the stomach, caused by a ring of tissue.    Varicose veins        Past Surgical History:    Past Surgical History:   Procedure Laterality Date    COLONOSCOPY  09/2023 2028      \[facility]    KNEE ARTHROSCOPY Left 1998    PROSTATE BIOPSY  08/2022    (-) unsure    PROSTATE BIOPSY  07/2022    Dr Sharpe @ [facility]    TOTAL KNEE ARTHROPLASTY Right 03/24/2025    Dr Starr @ [facility]    ULTRASOUND PROSTATE/TRANSRECTAL N/A 06/22/2020    TRANSRECTAL ULTRASOUND WITH PROSTATE  BIOPSY performed by Shayne Larsen MD at [facility] OR    UPPER GASTROINTESTINAL ENDOSCOPY N/A 11/2020    done on  and   [facility]       Medications Prior to Admission:    Prior to Admission medications    Medication Sig Start Date End Date Taking? Authorizing Provider   flecainide (TAMBOCOR) 50 MG tablet Take 1 tablet by mouth 2 times daily 5/30/25  Yes Sami Dubon MD   tamsulosin (FLOMAX) 0.4 MG capsule TAKE 1 CAPSULE BY MOUTH EVERY DAY 5/23/25  Yes Traci Beatty PA-C   finasteride (PROSCAR) 5 MG tablet Take 1 tablet by mouth daily 3/12/25  Yes Osmany Lvoing PA-C   vitamin D3 (CHOLECALCIFEROL) 10 MCG (400 UNIT) TABS tablet Take 1 tablet by mouth daily   Yes Provider, MD Sonia   Coenzyme Q10

## 2025-06-03 NOTE — ANESTHESIA POSTPROCEDURE EVALUATION
Department of Anesthesiology  Postprocedure Note    Patient: Imer Heaton  MRN: [id number]  YOB: 1955  Date of evaluation: 6/3/2025    Procedure Summary       Date: 06/03/25 Room / Location: [facility] OR 08 / [facility] OR    Anesthesia Start: 1159 Anesthesia Stop: 1302    Procedure: CYSTOSCOPY GREENLIGHT PHOTO VAPORIZATION OF PROSTATE Diagnosis:       BPH with urinary obstruction      (BPH with urinary obstruction [N40.1, N13.8])    Surgeons: Navdeep Sharpe MD Responsible Provider: Benny Savage DO    Anesthesia Type: general ASA Status: 2            Anesthesia Type: No value filed.    Idalia Phase I: Idalia Score: 10    Idalia Phase II:      Anesthesia Post Evaluation    Patient location during evaluation: PACU  Patient participation: complete - patient participated  Level of consciousness: awake  Airway patency: patent  Nausea & Vomiting: no nausea  Cardiovascular status: hemodynamically stable  Respiratory status: acceptable  Hydration status: stable  Pain management: adequate    No notable events documented.

## 2025-06-03 NOTE — PROGRESS NOTES
1300- pt to pacu, resp easy and unlabored, VSS, pt appears in no acute distress, pt with deutsch catheter and CBI present, Bag 1 has 1000mLs left, bag 2 has 2225mLs left, 750mLs of urine emptied from drainage bag, pt denies pain and nausea at this time  1310- pt resting in bed with eyes closed, resp easy and unlabored, VSS, pt appears in no acute distress, pt denies pain  1320- pt remains in stable condition, pt denies pain  1330- pt meets criteria for discharge from pacu, pt transported to [facility] in stable condition

## 2025-06-03 NOTE — PLAN OF CARE
Patient discharge orders placed per recommendations of Dr. Navdeep Sharpe as written in the brief operative report.    Prescriptions Ordered:  Orders Placed This Encounter   Medications    sodium chloride flush 0.9 % injection 5-40 mL    sodium chloride flush 0.9 % injection 5-40 mL    0.9 % sodium chloride infusion    ceFAZolin (ANCEF) 2,000 mg in sterile water 20 mL IV syringe     Default Settings:Auto-dosed by Weight On Call to O.R x 1 dose  Auto-dosed: Pt Wt<119.9kg-2gm, >120kg-3gm     Antimicrobial Indications:   Surgical Prophylaxis    HYDROcodone-acetaminophen (NORCO) 5-325 MG per tablet     Sig: Take 1 tablet by mouth every 6 hours as needed for Pain for up to 3 days. Intended supply: 3 days. Take lowest dose possible to manage pain Max Daily Amount: 4 tablets     Dispense:  10 tablet     Refill:  0     Reduce doses taken as pain becomes manageable    ciprofloxacin (CIPRO) 500 MG tablet     Sig: Take 1 tablet by mouth 2 times daily for 5 days     Dispense:  10 tablet     Refill:  0

## 2025-06-03 NOTE — PROGRESS NOTES
Patient oriented to Same Day department and admitted to Same Day Surgery room 3.   Patient verbalized approval for first name, last initial with physician name on unit whiteboard.     Plan of care reviewed with patient.   Patient room whiteboard filled out and discussed with patient and responsible adult.   Patient and responsible adult offered Same Day Welcome Packet to review.    Call light in reach.   Bed in lowest position, locked, with one bed rail up.   SCDs and warming blanket in place.  Appropriate arm bands on patient.   Bathroom offered.   All questions and concerns of patient addressed.        Meds to Beds:   Patient informed of [facility] Meds to Beds program during admission. Patient is agreeable to program.   Contact information for the pharmacy and the Meds to Beds program:   Name: Sherice   Relationship to patient:spouse/significant other   Phone number: [phone]

## 2025-06-03 NOTE — BRIEF OP NOTE
Brief Postoperative Note      Patient: Imer Heaton  YOB: 1955  MRN: [id number]    Date of Procedure: 6/3/2025    Pre-Op Diagnosis Codes:      * BPH with urinary obstruction [N40.1, N13.8]    Post-Op Diagnosis: Same       Procedure(s):  CYSTOSCOPY GREENLIGHT PHOTO VAPORIZATION OF PROSTATE    Surgeon(s):  Navdeep Sharpe MD    Assistant:  * No surgical staff found *    Anesthesia: General    Estimated Blood Loss (mL): Minimal    Complications: None    Specimens:   * No specimens in log *    Implants:  * No implants in log *      Drains:   Urinary Catheter 06/03/25 3 Way (Active)   Catheter Indications Urinary retention (acute or chronic), continuous bladder irrigation or bladder outlet obstruction 06/03/25 1306   Site Assessment No urethral drainage 06/03/25 1306   Urine Color Tea 06/03/25 1306   Urine Appearance Sediment 06/03/25 1306   Collection Container Standard 06/03/25 1306   Catheter Best Practices  Drainage tube clipped to bed 06/03/25 1306   Status Draining;Continuous bladder irrigation 06/03/25 1306       [REMOVED] Urinary Catheter 05/11/25 2 Way (Removed)   $ Urethral catheter insertion $ Not inserted for procedure 05/11/25 2101   Catheter Indications Urinary retention (acute or chronic), continuous bladder irrigation or bladder outlet obstruction 05/11/25 2101   Urine Color Yellow 05/11/25 2101       Findings:  Preciado out tomorrow pain meds abx   2-3 weeks tony    Electronically signed by NAVDEEP SHARPE MD on 6/3/2025 at 1:07 PM

## 2025-06-03 NOTE — ANESTHESIA PRE PROCEDURE
urinary obstruction N40.1, N13.8       Past Medical History:        Diagnosis Date   • BPH (benign prostatic hyperplasia)    • Esophagitis, acute 12/2020    [facility]  egd   • GERD (gastroesophageal reflux disease)    • Headache(784.0)    • Premature atrial complex 2023   • Schatzki's ring     narrowing in the esophagus, specifically at the junction where it meets the stomach, caused by a ring of tissue.   • Varicose veins        Past Surgical History:        Procedure Laterality Date   • COLONOSCOPY  09/2023 2028      \[facility]   • KNEE ARTHROSCOPY Left 1998   • PROSTATE BIOPSY  08/2022    (-) unsure   • PROSTATE BIOPSY  07/2022    Dr Sharpe @ [facility]   • TOTAL KNEE ARTHROPLASTY Right 03/24/2025    Dr Starr @ [facility]   • ULTRASOUND PROSTATE/TRANSRECTAL N/A 06/22/2020    TRANSRECTAL ULTRASOUND WITH PROSTATE  BIOPSY performed by Shayne Larsen MD at [facility] OR   • UPPER GASTROINTESTINAL ENDOSCOPY N/A 11/2020    done on  and   [facility]       Social History:    Social History     Tobacco Use   • Smoking status: Never     Passive exposure: Never   • Smokeless tobacco: Never   Substance Use Topics   • Alcohol use: Not Currently     Comment: Moderate on weekends but none the last month                                Counseling given: Not Answered      Vital Signs (Current):   Vitals:    05/29/25 1353 06/03/25 0953   BP:  127/73   Pulse:  70   Resp:  16   Temp:  97.7 °F (36.5 °C)   TempSrc:  Temporal   SpO2:  95%   Weight: 79.4 kg (175 lb) 78 kg (172 lb)   Height: 1.753 m (5' 9\") 1.753 m (5' 9\")                                              BP Readings from Last 3 Encounters:   06/03/25 127/73   05/16/25 116/68   05/11/25 (!) 140/73       NPO Status: Time of last liquid consumption: 0745                        Time of last solid consumption: 2100                        Date of last liquid consumption: 06/03/25                        Date of last solid food consumption: 06/02/25    BMI:   Wt Readings from Last 3

## 2025-06-04 ENCOUNTER — CLINICAL SUPPORT (OUTPATIENT)
Dept: UROLOGY | Age: 70
End: 2025-06-04

## 2025-06-04 DIAGNOSIS — N40.1 BENIGN LOCALIZED PROSTATIC HYPERPLASIA WITH LOWER URINARY TRACT SYMPTOMS (LUTS): Primary | ICD-10-CM

## 2025-06-04 PROCEDURE — NBSRV NON-BILLABLE SERVICE

## 2025-06-04 RX ORDER — CEFDINIR 300 MG/1
300 CAPSULE ORAL 2 TIMES DAILY
Qty: 10 CAPSULE | Refills: 0 | Status: SHIPPED | OUTPATIENT
Start: 2025-06-04 | End: 2025-06-09

## 2025-06-04 NOTE — PROGRESS NOTES
Please call the office at [phone] if you have any questions or concerns following your visit. If you were prescribed a new medication and have questions, feel free to call. For any emergent issues, please go to the nearest emergency room for further evaluation.

## 2025-06-04 NOTE — PROGRESS NOTES
Patient has given me verbal consent to perform deutsch removal  Yes    DIAGNOSIS/INDICATION:  Benign localized prostatic hyperplasia with lower urinary tract symptoms     Per Kieran Pan APRN 20 cc of water deflated from deutsch balloon. 22 Fr straight deutsch removed without difficulty.    Foreskin reduced back down?  N/A        Pt will drink fluids and report to ER in 6-8 hours if patient unable to urinate.      F/u with provider as directed.

## 2025-06-05 NOTE — OP NOTE
Navdeep Sharpe MD.  Urologic Surgery      [facility]    DATE: 6/3/2025  Patient:  Imer Heaton  MRN: [id number]  YOB: 1955    SURGEON: Navdeep Sharpe MD.    ASSISTANT: none    PREOPERATIVE DIAGNOSIS: BPH with outlet obstruction    POSTOPERATIVE DIAGNOSIS: BPH with outlet obstruction    PROCEDURE PERFORMED:  Cystoscopy, Greenlight Photovaporization of Prostate,        ANESTHESIA: General    COMPLICATIONS: none    OR BLOOD LOSS:  Minimal    FLUIDS: Cystalloids per Anesthesia    SPECIMENS:  * No specimens in log *      DRAINS: [address] deutsch    INDICATIONS FOR PROCEDURE:  The patient is a 70 y.o. male who presents today with BPH with urinary obstruction [N40.1, N13.8] here for CYSTOSCOPY GREENLIGHT PHOTO VAPORIZATION OF PROSTATE. After risks, benefits and alternatives of the procedure were discussed with the patient, the patient elected to proceed.     DETAILS OF PROCEDURE:  After informed consent was obtained in the preoperative area, the patient was taken back to the operating room and transferred to the operating table in supine position.  EPC cuffs were placed. The machine was turned on.  Anesthesia was induced and antibiotics were given.  The patient was placed in modified dorsal lithotomy position and sterilely prepped and draped in a standard fashion.  A timeout occurred.  Two patient identifiers were used.  The 25F rigid scope with the visual obturator was carefully advanced through the urethra.  .  Once within the bladder the visual obturator was exchanged for the resection bridge.     the ureteral orifices were very close to the bladder neck    The prostate was surveyed. the lateral lobes were noted to be significantly obstructing.. There was no median lobe present. Enucleation of the median lobe was not performed. The vaporization was started proximal with a power setting of 80W. Both the left and right lateral lobes were vaporized in their entirety down to the level of

## 2025-06-30 RX ORDER — FLECAINIDE ACETATE 50 MG/1
50 TABLET ORAL 2 TIMES DAILY
Qty: 180 TABLET | Refills: 1 | OUTPATIENT
Start: 2025-06-30

## 2025-07-01 ENCOUNTER — OFFICE VISIT (OUTPATIENT)
Dept: UROLOGY | Age: 70
End: 2025-07-01
Payer: MEDICARE

## 2025-07-01 VITALS — RESPIRATION RATE: 16 BRPM | HEIGHT: 69 IN | WEIGHT: 172 LBS | BODY MASS INDEX: 25.48 KG/M2

## 2025-07-01 DIAGNOSIS — Z87.898 HISTORY OF URINARY RETENTION: ICD-10-CM

## 2025-07-01 DIAGNOSIS — R97.20 ELEVATED PSA: ICD-10-CM

## 2025-07-01 DIAGNOSIS — R30.0 DYSURIA: ICD-10-CM

## 2025-07-01 DIAGNOSIS — N40.1 BENIGN LOCALIZED PROSTATIC HYPERPLASIA WITH LOWER URINARY TRACT SYMPTOMS (LUTS): Primary | ICD-10-CM

## 2025-07-01 LAB
BILIRUBIN, URINE: NEGATIVE
BLOOD URINE, POC: ABNORMAL
COLOR, UA: YELLOW
NITRITE, URINE: NEGATIVE
PH, URINE: 6 (ref 5–9)
POST VOID RESIDUAL (PVR): 101 ML
PROTEIN, URINE: 30 MG/DL
SPECIFIC GRAVITY UA: <= 1.005 (ref 1–1.03)
UROBILINOGEN, URINE: 0.2 EU/DL (ref 0–1)

## 2025-07-01 PROCEDURE — 99024 POSTOP FOLLOW-UP VISIT: CPT

## 2025-07-01 PROCEDURE — 81003 URINALYSIS AUTO W/O SCOPE: CPT

## 2025-07-01 PROCEDURE — 51798 US URINE CAPACITY MEASURE: CPT

## 2025-07-01 RX ORDER — CIPROFLOXACIN 500 MG/1
500 TABLET, FILM COATED ORAL 2 TIMES DAILY
Qty: 14 TABLET | Refills: 0 | Status: SHIPPED | OUTPATIENT
Start: 2025-07-01 | End: 2025-07-08

## 2025-07-01 NOTE — TELEPHONE ENCOUNTER
Date of last visit:  5/16/2025  Date of next visit:  11/17/2025    Requested Prescriptions     Pending Prescriptions Disp Refills    flecainide (TAMBOCOR) 50 MG tablet 60 tablet 1     Sig: Take 1 tablet by mouth 2 times daily

## 2025-07-01 NOTE — TELEPHONE ENCOUNTER
Patient called to req an refill on the following    flecainide (TAMBOCOR) 50 MG tablet   Take 1 tablet by mouth 2 times daily     Please send to [facility]    Patient stated Jagdish's office will not refill

## 2025-07-01 NOTE — PROGRESS NOTES
[facility] [place] [facility]  [facility]  [address]  [address]  [place] [facility] [address]  Dept: [phone]  Loc: [phone]  Visit Date: 7/1/2025      HPI  Imer Heaton is a 70 y.o. male that presents to the urology clinic for BPH follow-up.    BPH  Managed on Flomax 0.4 mg BID. Symptoms have declined significantly over the past 6 months despite his increase in Flomax. Weak urinary stream, urinary frequency, feelings of incomplete emptying prior to surgery. Nocturia x 2-3. IPSS of 22/3 at his last visit. Now S/P Cystoscopy, Greenlight PVP by Dr. Sharpe on 06/03/25. Residual hesitancy, frequency, urgency. Gross hematuria resolved. Stream improved.    Acute Urinary Retention  First instance of urinary retention 2/2 to anesthesia. Patient underwent right knee surgery on 03/24/25 after which, he was unable to urinate. Resolved.    Elevated PSA  PSA remains elevated at 6.20 which is comparable to past checks (previous high of 5.89).  Hx of negative biopsies with Dr. Larsen in 2020 and Dr. Sharpe in 2022.    PSA Trend  6.20   (03/07/25)  4.64   (03/04/24)  5.50   (06/02/22)  5.89   (02/15/22)  3.91   (02/15/21)  5.05   (02/20/20)      Results for orders placed or performed in visit on 07/01/25   POCT Urinalysis No Micro (Auto)   Result Value Ref Range    Glucose, Ur Negative NEGATIVE mg/dl    Bilirubin, Urine Negative     Ketones, Urine Negative NEGATIVE    Specific Gravity, UA <=1.005 1.002 - 1.030    Blood, UA POC Large (A) NEGATIVE    pH, Urine 6.00 5.0 - 9.0    Protein, Urine 30 (A) NEGATIVE mg/dl    Urobilinogen, Urine 0.20 0.0 - 1.0 eu/dl    Nitrite, Urine Negative NEGATIVE    Leukocyte Clumps, Urine Large (A) NEGATIVE    Color, UA Yellow [id number]-[id number]    Character, Urine Clear [id number].[id number]   poct post void residual   Result Value Ref Range    post void residual 101 ml         Last BUN and Creatinine:  Lab Results   Component Value Date    BUN 12 03/04/2024     Lab Results

## 2025-07-02 RX ORDER — FLECAINIDE ACETATE 50 MG/1
50 TABLET ORAL 2 TIMES DAILY
Qty: 60 TABLET | Refills: 1 | Status: SHIPPED | OUTPATIENT
Start: 2025-07-02

## 2025-07-03 ENCOUNTER — RESULTS FOLLOW-UP (OUTPATIENT)
Dept: UROLOGY | Age: 70
End: 2025-07-03

## 2025-07-03 LAB — ORGANISM: ABNORMAL

## 2025-07-21 RX ORDER — FLECAINIDE ACETATE 50 MG/1
50 TABLET ORAL 2 TIMES DAILY
Qty: 180 TABLET | Refills: 3 | Status: SHIPPED | OUTPATIENT
Start: 2025-07-21

## 2025-08-12 ENCOUNTER — OFFICE VISIT (OUTPATIENT)
Dept: UROLOGY | Age: 70
End: 2025-08-12
Payer: MEDICARE

## 2025-08-12 VITALS — BODY MASS INDEX: 25.48 KG/M2 | HEIGHT: 69 IN | WEIGHT: 172 LBS

## 2025-08-12 DIAGNOSIS — N40.1 BENIGN LOCALIZED PROSTATIC HYPERPLASIA WITH LOWER URINARY TRACT SYMPTOMS (LUTS): Primary | ICD-10-CM

## 2025-08-12 DIAGNOSIS — R97.20 ELEVATED PSA: ICD-10-CM

## 2025-08-12 DIAGNOSIS — Z87.898 HISTORY OF URINARY RETENTION: ICD-10-CM

## 2025-08-12 LAB
BILIRUBIN, URINE: NEGATIVE
BLOOD URINE, POC: ABNORMAL
CHARACTER, URINE: CLEAR
COLOR, UA: YELLOW
GLUCOSE URINE: NEGATIVE MG/DL
KETONES, URINE: NEGATIVE
LEUKOCYTE CLUMPS, URINE: ABNORMAL
NITRITE, URINE: NEGATIVE
PH, URINE: 5.5 (ref 5–9)
POST VOID RESIDUAL (PVR): 49 ML
PROTEIN, URINE: 100 MG/DL
SPECIFIC GRAVITY UA: 1.02 (ref 1–1.03)
UROBILINOGEN, URINE: 0.2 EU/DL (ref 0–1)

## 2025-08-12 PROCEDURE — 51798 US URINE CAPACITY MEASURE: CPT

## 2025-08-12 PROCEDURE — 99024 POSTOP FOLLOW-UP VISIT: CPT

## 2025-08-12 PROCEDURE — 81003 URINALYSIS AUTO W/O SCOPE: CPT

## 2025-08-28 ENCOUNTER — TELEPHONE (OUTPATIENT)
Dept: CARDIOLOGY CLINIC | Age: 70
End: 2025-08-28

## (undated) DEVICE — 1000ML,PRESSURE INFUSER W/STOPCOCK: Brand: MEDLINE

## (undated) DEVICE — SOL IRR SOD CHL 0.9% TITAN XL CNTNR 3000ML

## (undated) DEVICE — SOLUTION IRRIG 1000ML STRL H2O USP PLAS POUR BTL

## (undated) DEVICE — CYSTO: Brand: MEDLINE INDUSTRIES, INC.

## (undated) DEVICE — FIBER LASER MOXY 532NM WAVELENGTH LIQUID COOLED SINGLE-USE F/GREENLIGHT XPS SYSTEM

## (undated) DEVICE — MAX-CORE® DISPOSABLE CORE BIOPSY INSTRUMENT, 18G X 25CM: Brand: MAX-CORE

## (undated) DEVICE — DRAINBAG,ANTI-REFLUX TOWER,L/F,2000ML,LL: Brand: MEDLINE

## (undated) DEVICE — CATHETER URETH 22FR 30CC BLLN F 3 W SPEC M RND TIP TWO